# Patient Record
Sex: MALE | Race: BLACK OR AFRICAN AMERICAN | NOT HISPANIC OR LATINO | URBAN - METROPOLITAN AREA
[De-identification: names, ages, dates, MRNs, and addresses within clinical notes are randomized per-mention and may not be internally consistent; named-entity substitution may affect disease eponyms.]

---

## 2019-03-06 ENCOUNTER — OUTPATIENT (OUTPATIENT)
Dept: OUTPATIENT SERVICES | Facility: HOSPITAL | Age: 24
LOS: 1 days | End: 2019-03-06
Payer: COMMERCIAL

## 2019-03-06 VITALS
RESPIRATION RATE: 16 BRPM | SYSTOLIC BLOOD PRESSURE: 121 MMHG | TEMPERATURE: 98 F | WEIGHT: 212.75 LBS | HEART RATE: 74 BPM | DIASTOLIC BLOOD PRESSURE: 82 MMHG

## 2019-03-06 DIAGNOSIS — Z98.890 OTHER SPECIFIED POSTPROCEDURAL STATES: Chronic | ICD-10-CM

## 2019-03-06 DIAGNOSIS — E04.1 NONTOXIC SINGLE THYROID NODULE: ICD-10-CM

## 2019-03-06 DIAGNOSIS — D34 BENIGN NEOPLASM OF THYROID GLAND: ICD-10-CM

## 2019-03-06 DIAGNOSIS — Z01.818 ENCOUNTER FOR OTHER PREPROCEDURAL EXAMINATION: ICD-10-CM

## 2019-03-06 DIAGNOSIS — Z29.9 ENCOUNTER FOR PROPHYLACTIC MEASURES, UNSPECIFIED: ICD-10-CM

## 2019-03-06 LAB
ANION GAP SERPL CALC-SCNC: 10 MMOL/L — SIGNIFICANT CHANGE UP (ref 5–17)
APTT BLD: 32.8 SEC — SIGNIFICANT CHANGE UP (ref 27.5–36.3)
BASOPHILS # BLD AUTO: 0 K/UL — SIGNIFICANT CHANGE UP (ref 0–0.2)
BASOPHILS NFR BLD AUTO: 0.9 % — SIGNIFICANT CHANGE UP (ref 0–2)
BLD GP AB SCN SERPL QL: SIGNIFICANT CHANGE UP
BUN SERPL-MCNC: 10 MG/DL — SIGNIFICANT CHANGE UP (ref 8–20)
CALCIUM SERPL-MCNC: 9.4 MG/DL — SIGNIFICANT CHANGE UP (ref 8.6–10.2)
CHLORIDE SERPL-SCNC: 106 MMOL/L — SIGNIFICANT CHANGE UP (ref 98–107)
CO2 SERPL-SCNC: 27 MMOL/L — SIGNIFICANT CHANGE UP (ref 22–29)
CREAT SERPL-MCNC: 0.88 MG/DL — SIGNIFICANT CHANGE UP (ref 0.5–1.3)
EOSINOPHIL # BLD AUTO: 0.1 K/UL — SIGNIFICANT CHANGE UP (ref 0–0.5)
EOSINOPHIL NFR BLD AUTO: 1.6 % — SIGNIFICANT CHANGE UP (ref 0–5)
GLUCOSE SERPL-MCNC: 111 MG/DL — SIGNIFICANT CHANGE UP (ref 70–115)
HCT VFR BLD CALC: 44.2 % — SIGNIFICANT CHANGE UP (ref 42–52)
HGB BLD-MCNC: 15.6 G/DL — SIGNIFICANT CHANGE UP (ref 14–18)
INR BLD: 1 RATIO — SIGNIFICANT CHANGE UP (ref 0.88–1.16)
LYMPHOCYTES # BLD AUTO: 1.5 K/UL — SIGNIFICANT CHANGE UP (ref 1–4.8)
LYMPHOCYTES # BLD AUTO: 33.6 % — SIGNIFICANT CHANGE UP (ref 20–55)
MCHC RBC-ENTMCNC: 31.6 PG — HIGH (ref 27–31)
MCHC RBC-ENTMCNC: 35.3 G/DL — SIGNIFICANT CHANGE UP (ref 32–36)
MCV RBC AUTO: 89.7 FL — SIGNIFICANT CHANGE UP (ref 80–94)
MONOCYTES # BLD AUTO: 0.4 K/UL — SIGNIFICANT CHANGE UP (ref 0–0.8)
MONOCYTES NFR BLD AUTO: 8.5 % — SIGNIFICANT CHANGE UP (ref 3–10)
NEUTROPHILS # BLD AUTO: 2.5 K/UL — SIGNIFICANT CHANGE UP (ref 1.8–8)
NEUTROPHILS NFR BLD AUTO: 55.2 % — SIGNIFICANT CHANGE UP (ref 37–73)
PLATELET # BLD AUTO: 218 K/UL — SIGNIFICANT CHANGE UP (ref 150–400)
POTASSIUM SERPL-MCNC: 4.4 MMOL/L — SIGNIFICANT CHANGE UP (ref 3.5–5.3)
POTASSIUM SERPL-SCNC: 4.4 MMOL/L — SIGNIFICANT CHANGE UP (ref 3.5–5.3)
PROTHROM AB SERPL-ACNC: 11.5 SEC — SIGNIFICANT CHANGE UP (ref 10–12.9)
RBC # BLD: 4.93 M/UL — SIGNIFICANT CHANGE UP (ref 4.6–6.2)
RBC # FLD: 12.9 % — SIGNIFICANT CHANGE UP (ref 11–15.6)
SODIUM SERPL-SCNC: 143 MMOL/L — SIGNIFICANT CHANGE UP (ref 135–145)
T3 SERPL-MCNC: 116 NG/DL — SIGNIFICANT CHANGE UP (ref 80–200)
T4 AB SER-ACNC: 5.1 UG/DL — SIGNIFICANT CHANGE UP (ref 4.5–12)
TSH SERPL-MCNC: 0.7 UIU/ML — SIGNIFICANT CHANGE UP (ref 0.27–4.2)
TYPE + AB SCN PNL BLD: SIGNIFICANT CHANGE UP
WBC # BLD: 4.5 K/UL — LOW (ref 4.8–10.8)
WBC # FLD AUTO: 4.5 K/UL — LOW (ref 4.8–10.8)

## 2019-03-06 PROCEDURE — 36415 COLL VENOUS BLD VENIPUNCTURE: CPT

## 2019-03-06 PROCEDURE — 86901 BLOOD TYPING SEROLOGIC RH(D): CPT

## 2019-03-06 PROCEDURE — 85610 PROTHROMBIN TIME: CPT

## 2019-03-06 PROCEDURE — 86850 RBC ANTIBODY SCREEN: CPT

## 2019-03-06 PROCEDURE — 84443 ASSAY THYROID STIM HORMONE: CPT

## 2019-03-06 PROCEDURE — 86900 BLOOD TYPING SEROLOGIC ABO: CPT

## 2019-03-06 PROCEDURE — 84436 ASSAY OF TOTAL THYROXINE: CPT

## 2019-03-06 PROCEDURE — 80048 BASIC METABOLIC PNL TOTAL CA: CPT

## 2019-03-06 PROCEDURE — 84480 ASSAY TRIIODOTHYRONINE (T3): CPT

## 2019-03-06 PROCEDURE — 85027 COMPLETE CBC AUTOMATED: CPT

## 2019-03-06 PROCEDURE — 85730 THROMBOPLASTIN TIME PARTIAL: CPT

## 2019-03-06 NOTE — H&P PST ADULT - FAMILY HISTORY
Father  Still living? Yes, Estimated age: 51-60  Family history of hypertension, Age at diagnosis: Age Unknown

## 2019-03-06 NOTE — H&P PST ADULT - NSANTHOSAYNRD_GEN_A_CORE
No. KAMINI screening performed.  STOP BANG Legend: 0-2 = LOW Risk; 3-4 = INTERMEDIATE Risk; 5-8 = HIGH Risk

## 2019-03-06 NOTE — ASU PATIENT PROFILE, ADULT - LEARNING ASSESSMENT (PATIENT) ADDITIONAL COMMENTS
presurgical, surgical scrun instructrions, pain management education given - verbalized understanding

## 2019-03-06 NOTE — H&P PST ADULT - HISTORY OF PRESENT ILLNESS
23 year old male went to ENT with c/o lump in mouth in NOV 2018, upon exam DR noticed right thyroid enlargement . Pt had sono and needle biopsy and results showed 60 % possibility of cancer as per pt.  Pt denied any difficulty swallowing and never noticed enlargement . Pt is scheduled for right hemithyroidectomy possible total thyroidectomy depending on findings.

## 2019-03-06 NOTE — H&P PST ADULT - ASSESSMENT
Pleasant 23 year old male in NAD with right thyroid nodule presents for right thyroidectomy .  CAPRINI VTE 2.0 SCORE [CLOT updated 2019]    AGE RELATED RISK FACTORS                                                       MOBILITY RELATED FACTORS  [ ] Age 41-60 years                                            (1 Point)                    [ ] Bed rest                                                        (1 Point)  [ ] Age: 61-74 years                                           (2 Points)                  [ ] Plaster cast                                                   (2 Points)  [ ] Age= 75 years                                              (3 Points)                    [ ] Bed bound for more than 72 hours                 (2 Points)    DISEASE RELATED RISK FACTORS                                               GENDER SPECIFIC FACTORS  [ ] Edema in the lower extremities                       (1 Point)              [ ] Pregnancy                                                     (1 Point)  [ ] Varicose veins                                               (1 Point)                     [ ] Post-partum < 6 weeks                                   (1 Point)             [ ] BMI > 25 Kg/m2                                            (1 Point)                     [ ] Hormonal therapy  or oral contraception          (1 Point)                 [ ] Sepsis (in the previous month)                        (1 Point)               [ ] History of pregnancy complications                 (1 point)  [ ] Pneumonia or serious lung disease                                               [ ] Unexplained or recurrent                     (1 Point)           (in the previous month)                               (1 Point)  [ ] Abnormal pulmonary function test                     (1 Point)                 SURGERY RELATED RISK FACTORS  [ ] Acute myocardial infarction                              (1 Point)               [ ]  Section                                             (1 Point)  [ ] Congestive heart failure (in the previous month)  (1 Point)      [ ] Minor surgery                                                  (1 Point)   [ ] Inflammatory bowel disease                             (1 Point)               [ ] Arthroscopic surgery                                        (2 Points)  [ ] Central venous access                                      (2 Points)                [X ] General surgery lasting more than 45 minutes (2 points)  [ X] Malignancy- Present or previous                   (2 Points)                [ ] Elective arthroplasty                                         (5 points)    [ ] Stroke (in the previous month)                          (5 Points)                                                                                                                                                           HEMATOLOGY RELATED FACTORS                                                 TRAUMA RELATED RISK FACTORS  [ ] Prior episodes of VTE                                     (3 Points)                [ ] Fracture of the hip, pelvis, or leg                       (5 Points)  [ ] Positive family history for VTE                         (3 Points)             [ ] Acute spinal cord injury (in the previous month)  (5 Points)  [ ] Prothrombin 35826 A                                     (3 Points)               [ ] Paralysis  (less than 1 month)                             (5 Points)  [ ] Factor V Leiden                                             (3 Points)                  [ ] Multiple Trauma within 1 month                        (5 Points)  [ ] Lupus anticoagulants                                     (3 Points)                                                           [ ] Anticardiolipin antibodies                               (3 Points)                                                       [ ] High homocysteine in the blood                      (3 Points)                                             [ ] Other congenital or acquired thrombophilia      (3 Points)                                                [ ] Heparin induced thrombocytopenia                  (3 Points)                                     Total Score [    4     ]  OPIOID RISK TOOL    CHE EACH BOX THAT APPLIES AND ADD TOTALS AT THE END    FAMILY HISTORY OF SUBSTANCE ABUSE                 FEMALE         MALE                                                Alcohol                             [  ]1 pt          [  ]3pts                                               Illegal Durgs                     [  ]2 pts        [  ]3pts                                               Rx Drugs                           [  ]4 pts        [  ]4 pts    PERSONAL HISTORY OF SUBSTANCE ABUSE                                                                                          Alcohol                             [  ]3 pts       [  ]3 pts                                               Illegal Durgs                     [  ]4 pts        [  ]4 pts                                               Rx Drugs                           [  ]5 pts        [  ]5 pts    AGE BETWEEN 16-45 YEARS                                      [  ]1 pt         [x  ]1 pt    HISTORY OF PREADOLESCENT   SEXUAL ABUSE                                                             [  ]3 pts        [  ]0pts    PSYCHOLOGICAL DISEASE                     ADD, OCD, Bipolar, Schizophrenia        [  ]2 pts         [  ]2 pts                      Depression                                               [  ]1 pt           [  ]1 pt           SCORING TOTAL   (add numbers and type here)              (*1**)                                     A score of 3 or lower indicated LOW risk for future opiod abuse  A score of 4 to 7 indicated moderate risk for future opiod abuse  A score of 8 or higher indicates a high risk for opiod abuse

## 2019-03-19 ENCOUNTER — TRANSCRIPTION ENCOUNTER (OUTPATIENT)
Age: 24
End: 2019-03-19

## 2019-03-20 ENCOUNTER — OUTPATIENT (OUTPATIENT)
Dept: INPATIENT UNIT | Facility: HOSPITAL | Age: 24
LOS: 1 days | End: 2019-03-20
Payer: COMMERCIAL

## 2019-03-20 ENCOUNTER — RESULT REVIEW (OUTPATIENT)
Age: 24
End: 2019-03-20

## 2019-03-20 VITALS
HEART RATE: 83 BPM | SYSTOLIC BLOOD PRESSURE: 117 MMHG | DIASTOLIC BLOOD PRESSURE: 72 MMHG | RESPIRATION RATE: 12 BRPM | OXYGEN SATURATION: 100 %

## 2019-03-20 VITALS
OXYGEN SATURATION: 100 % | WEIGHT: 212.75 LBS | TEMPERATURE: 98 F | SYSTOLIC BLOOD PRESSURE: 122 MMHG | HEART RATE: 98 BPM | DIASTOLIC BLOOD PRESSURE: 76 MMHG | HEIGHT: 68 IN | RESPIRATION RATE: 16 BRPM

## 2019-03-20 DIAGNOSIS — Z98.890 OTHER SPECIFIED POSTPROCEDURAL STATES: Chronic | ICD-10-CM

## 2019-03-20 DIAGNOSIS — D34 BENIGN NEOPLASM OF THYROID GLAND: ICD-10-CM

## 2019-03-20 DIAGNOSIS — Z98.890 OTHER SPECIFIED POSTPROCEDURAL STATES: ICD-10-CM

## 2019-03-20 PROCEDURE — 88307 TISSUE EXAM BY PATHOLOGIST: CPT

## 2019-03-20 PROCEDURE — 88331 PATH CONSLTJ SURG 1 BLK 1SPC: CPT

## 2019-03-20 PROCEDURE — 88307 TISSUE EXAM BY PATHOLOGIST: CPT | Mod: 26

## 2019-03-20 PROCEDURE — 88331 PATH CONSLTJ SURG 1 BLK 1SPC: CPT | Mod: 26

## 2019-03-20 PROCEDURE — 60210 PARTIAL THYROID EXCISION: CPT | Mod: RT

## 2019-03-20 RX ORDER — ONDANSETRON 8 MG/1
4 TABLET, FILM COATED ORAL ONCE
Qty: 0 | Refills: 0 | Status: DISCONTINUED | OUTPATIENT
Start: 2019-03-20 | End: 2019-03-20

## 2019-03-20 RX ORDER — SODIUM CHLORIDE 9 MG/ML
1000 INJECTION, SOLUTION INTRAVENOUS
Qty: 0 | Refills: 0 | Status: DISCONTINUED | OUTPATIENT
Start: 2019-03-20 | End: 2019-03-20

## 2019-03-20 RX ORDER — HYDROMORPHONE HYDROCHLORIDE 2 MG/ML
0.5 INJECTION INTRAMUSCULAR; INTRAVENOUS; SUBCUTANEOUS
Qty: 0 | Refills: 0 | Status: DISCONTINUED | OUTPATIENT
Start: 2019-03-20 | End: 2019-03-20

## 2019-03-20 RX ORDER — FENTANYL CITRATE 50 UG/ML
50 INJECTION INTRAVENOUS
Qty: 0 | Refills: 0 | Status: DISCONTINUED | OUTPATIENT
Start: 2019-03-20 | End: 2019-03-20

## 2019-03-20 NOTE — BRIEF OPERATIVE NOTE - NSICDXBRIEFPREOP_GEN_ALL_CORE_FT
PRE-OP DIAGNOSIS:  Thyroid nodule 20-Mar-2019 11:50:33  Ric Edwards  Thyroid nodule 20-Mar-2019 11:50:15  Ric Edwards

## 2019-04-02 LAB — SURGICAL PATHOLOGY STUDY: SIGNIFICANT CHANGE UP

## 2019-04-15 PROBLEM — E04.1 NONTOXIC SINGLE THYROID NODULE: Chronic | Status: ACTIVE | Noted: 2019-03-06

## 2019-05-15 ENCOUNTER — OUTPATIENT (OUTPATIENT)
Dept: OUTPATIENT SERVICES | Facility: HOSPITAL | Age: 24
LOS: 1 days | End: 2019-05-15
Payer: COMMERCIAL

## 2019-05-15 VITALS
SYSTOLIC BLOOD PRESSURE: 104 MMHG | HEIGHT: 68 IN | RESPIRATION RATE: 16 BRPM | DIASTOLIC BLOOD PRESSURE: 68 MMHG | TEMPERATURE: 98 F | HEART RATE: 72 BPM | WEIGHT: 210.54 LBS

## 2019-05-15 DIAGNOSIS — Z98.890 OTHER SPECIFIED POSTPROCEDURAL STATES: Chronic | ICD-10-CM

## 2019-05-15 DIAGNOSIS — Z29.9 ENCOUNTER FOR PROPHYLACTIC MEASURES, UNSPECIFIED: ICD-10-CM

## 2019-05-15 DIAGNOSIS — C73 MALIGNANT NEOPLASM OF THYROID GLAND: ICD-10-CM

## 2019-05-15 DIAGNOSIS — Z01.818 ENCOUNTER FOR OTHER PREPROCEDURAL EXAMINATION: ICD-10-CM

## 2019-05-15 LAB
ANION GAP SERPL CALC-SCNC: 10 MMOL/L — SIGNIFICANT CHANGE UP (ref 5–17)
BASOPHILS # BLD AUTO: 0 K/UL — SIGNIFICANT CHANGE UP (ref 0–0.2)
BASOPHILS NFR BLD AUTO: 1 % — SIGNIFICANT CHANGE UP (ref 0–2)
BUN SERPL-MCNC: 14 MG/DL — SIGNIFICANT CHANGE UP (ref 8–20)
CALCIUM SERPL-MCNC: 9.7 MG/DL — SIGNIFICANT CHANGE UP (ref 8.6–10.2)
CHLORIDE SERPL-SCNC: 102 MMOL/L — SIGNIFICANT CHANGE UP (ref 98–107)
CO2 SERPL-SCNC: 28 MMOL/L — SIGNIFICANT CHANGE UP (ref 22–29)
CREAT SERPL-MCNC: 0.98 MG/DL — SIGNIFICANT CHANGE UP (ref 0.5–1.3)
EOSINOPHIL # BLD AUTO: 0.1 K/UL — SIGNIFICANT CHANGE UP (ref 0–0.5)
EOSINOPHIL NFR BLD AUTO: 1.5 % — SIGNIFICANT CHANGE UP (ref 0–5)
GLUCOSE SERPL-MCNC: 103 MG/DL — SIGNIFICANT CHANGE UP (ref 70–115)
HCT VFR BLD CALC: 46.9 % — SIGNIFICANT CHANGE UP (ref 42–52)
HGB BLD-MCNC: 16.2 G/DL — SIGNIFICANT CHANGE UP (ref 14–18)
LYMPHOCYTES # BLD AUTO: 1.7 K/UL — SIGNIFICANT CHANGE UP (ref 1–4.8)
LYMPHOCYTES # BLD AUTO: 32 % — SIGNIFICANT CHANGE UP (ref 20–55)
MCHC RBC-ENTMCNC: 31.1 PG — HIGH (ref 27–31)
MCHC RBC-ENTMCNC: 34.5 G/DL — SIGNIFICANT CHANGE UP (ref 32–36)
MCV RBC AUTO: 90 FL — SIGNIFICANT CHANGE UP (ref 80–94)
MONOCYTES # BLD AUTO: 0.3 K/UL — SIGNIFICANT CHANGE UP (ref 0–0.8)
MONOCYTES NFR BLD AUTO: 5.8 % — SIGNIFICANT CHANGE UP (ref 3–10)
NEUTROPHILS # BLD AUTO: 3.1 K/UL — SIGNIFICANT CHANGE UP (ref 1.8–8)
NEUTROPHILS NFR BLD AUTO: 59.7 % — SIGNIFICANT CHANGE UP (ref 37–73)
PLATELET # BLD AUTO: 242 K/UL — SIGNIFICANT CHANGE UP (ref 150–400)
POTASSIUM SERPL-MCNC: 4.6 MMOL/L — SIGNIFICANT CHANGE UP (ref 3.5–5.3)
POTASSIUM SERPL-SCNC: 4.6 MMOL/L — SIGNIFICANT CHANGE UP (ref 3.5–5.3)
RBC # BLD: 5.21 M/UL — SIGNIFICANT CHANGE UP (ref 4.6–6.2)
RBC # FLD: 12.9 % — SIGNIFICANT CHANGE UP (ref 11–15.6)
SODIUM SERPL-SCNC: 140 MMOL/L — SIGNIFICANT CHANGE UP (ref 135–145)
WBC # BLD: 5.2 K/UL — SIGNIFICANT CHANGE UP (ref 4.8–10.8)
WBC # FLD AUTO: 5.2 K/UL — SIGNIFICANT CHANGE UP (ref 4.8–10.8)

## 2019-05-15 PROCEDURE — 36415 COLL VENOUS BLD VENIPUNCTURE: CPT

## 2019-05-15 PROCEDURE — 80048 BASIC METABOLIC PNL TOTAL CA: CPT

## 2019-05-15 PROCEDURE — 85027 COMPLETE CBC AUTOMATED: CPT

## 2019-05-15 PROCEDURE — G0463: CPT

## 2019-05-15 NOTE — H&P PST ADULT - NSICDXPROBLEM_GEN_ALL_CORE_FT
PROBLEM DIAGNOSES  Problem: Prophylactic measure  Assessment and Plan: caprini score 4 , scds ordered, surgical personnell to assess for pharm proph need    Problem: Thyroid ca  Assessment and Plan: Completion thyroidectomy

## 2019-05-15 NOTE — H&P PST ADULT - ASSESSMENT
Pt is a 22 y/o male who presents for evaluation and testing for completion thyroidectomy. Pt is in good health as per physical examination.  OPIOID RISK TOOL    CHE EACH BOX THAT APPLIES AND ADD TOTALS AT THE END    FAMILY HISTORY OF SUBSTANCE ABUSE                 FEMALE         MALE                                                Alcohol                             [  ]1 pt          [  ]3pts                                               Illegal Durgs                     [  ]2 pts        [  ]3pts                                               Rx Drugs                           [  ]4 pts        [  ]4 pts    PERSONAL HISTORY OF SUBSTANCE ABUSE                                                                                          Alcohol                             [  ]3 pts       [  ]3 pts                                               Illegal Drugs                     [  ]4 pts        [  ]4 pts                                               Rx Drugs                           [  ]5 pts        [  ]5 pts    AGE BETWEEN 16-45 YEARS                                      [  ]1 pt         [ x ]1 pt    HISTORY OF PREADOLESCENT   SEXUAL ABUSE                                                             [  ]3 pts        [  ]0pts    PSYCHOLOGICAL DISEASE                     ADD, OCD, Bipolar, Schizophrenia        [  ]2 pts         [  ]2 pts                      Depression                                               [  ]1 pt           [  ]1 pt           SCORING TOTAL   (add numbers and type here)              (1)                CAPRINI VTE 2.0 SCORE [CLOT updated 2019]    AGE RELATED RISK FACTORS                                                       MOBILITY RELATED FACTORS  [ ] Age 41-60 years                                            (1 Point)                    [ ] Bed rest                                                        (1 Point)  [ ] Age: 61-74 years                                           (2 Points)                  [ ] Plaster cast                                                   (2 Points)  [ ] Age= 75 years                                              (3 Points)                    [ ] Bed bound for more than 72 hours                 (2 Points)    DISEASE RELATED RISK FACTORS                                               GENDER SPECIFIC FACTORS  [ ] Edema in the lower extremities                       (1 Point)              [ ] Pregnancy                                                     (1 Point)  [ ] Varicose veins                                               (1 Point)                     [ ] Post-partum < 6 weeks                                   (1 Point)             [x ] BMI > 25 Kg/m2                                            (1 Point)                     [ ] Hormonal therapy  or oral contraception          (1 Point)                 [ ] Sepsis (in the previous month)                        (1 Point)               [ ] History of pregnancy complications                 (1 point)  [ ] Pneumonia or serious lung disease                                               [ ] Unexplained or recurrent                     (1 Point)           (in the previous month)                               (1 Point)  [ ] Abnormal pulmonary function test                     (1 Point)                 SURGERY RELATED RISK FACTORS  [ ] Acute myocardial infarction                              (1 Point)               [ ]  Section                                             (1 Point)  [ ] Congestive heart failure (in the previous month)  (1 Point)      [x ] Minor surgery                                                  (1 Point)   [ ] Inflammatory bowel disease                             (1 Point)               [ ] Arthroscopic surgery                                        (2 Points)  [ ] Central venous access                                      (2 Points)                [ ] General surgery lasting more than 45 minutes (2 points)  [x ] Malignancy- Present or previous                   (2 Points)                [ ] Elective arthroplasty                                         (5 points)    [ ] Stroke (in the previous month)                          (5 Points)                                                                                                                                                           HEMATOLOGY RELATED FACTORS                                                 TRAUMA RELATED RISK FACTORS  [ ] Prior episodes of VTE                                     (3 Points)                [ ] Fracture of the hip, pelvis, or leg                       (5 Points)  [ ] Positive family history for VTE                         (3 Points)             [ ] Acute spinal cord injury (in the previous month)  (5 Points)  [ ] Prothrombin 34639 A                                     (3 Points)               [ ] Paralysis  (less than 1 month)                             (5 Points)  [ ] Factor V Leiden                                             (3 Points)                  [ ] Multiple Trauma within 1 month                        (5 Points)  [ ] Lupus anticoagulants                                     (3 Points)                                                           [ ] Anticardiolipin antibodies                               (3 Points)                                                       [ ] High homocysteine in the blood                      (3 Points)                                             [ ] Other congenital or acquired thrombophilia      (3 Points)                                                [ ] Heparin induced thrombocytopenia                  (3 Points)                                     Total Score [  4       ]                         A score of 3 or lower indicated LOW risk for future opioid abuse  A score of 4 to 7 indicated moderate risk for future opioid abuse  A score of 8 or higher indicates a high risk for opioid abuse

## 2019-05-15 NOTE — H&P PST ADULT - HISTORY OF PRESENT ILLNESS
23 year old male with PMH thyroid nodule presents for evaluation and testing for completion thyroidectomy. Pt states he went to ENT with c/o lump in mouth in NOV 2018, upon exam DR noticed right thyroid nodule. Pt had sono and needle biopsy and results showed 60 % possibility of cancer. Pt had right hemithyroidectomy which resulted in the finding of cancerous cells, so completion thyroidectomy was scheduled  . Pt denied any difficulty swallowing and never noticed enlargement. Pt s/s of hypo/hyperthyroidism.

## 2019-05-15 NOTE — H&P PST ADULT - NSICDXFAMILYHX_GEN_ALL_CORE_FT
FAMILY HISTORY:  FH: diabetes mellitus, maternal and paternal grandparents  FH: prostate cancer, gradfather    Father  Still living? Yes, Estimated age: 51-60  Family history of hypertension, Age at diagnosis: Age Unknown

## 2019-05-29 ENCOUNTER — INPATIENT (INPATIENT)
Facility: HOSPITAL | Age: 24
LOS: 0 days | Discharge: HOME | DRG: 983 | End: 2019-05-30
Attending: OTOLARYNGOLOGY | Admitting: OTOLARYNGOLOGY
Payer: COMMERCIAL

## 2019-05-29 VITALS
HEART RATE: 86 BPM | DIASTOLIC BLOOD PRESSURE: 85 MMHG | OXYGEN SATURATION: 100 % | WEIGHT: 210.54 LBS | RESPIRATION RATE: 16 BRPM | HEIGHT: 68 IN | SYSTOLIC BLOOD PRESSURE: 126 MMHG | TEMPERATURE: 98 F

## 2019-05-29 DIAGNOSIS — C73 MALIGNANT NEOPLASM OF THYROID GLAND: ICD-10-CM

## 2019-05-29 DIAGNOSIS — Z98.890 OTHER SPECIFIED POSTPROCEDURAL STATES: Chronic | ICD-10-CM

## 2019-05-29 LAB — PTH-INTACT IO % DIF SERPL: 38 PG/ML — SIGNIFICANT CHANGE UP (ref 12–88)

## 2019-05-29 PROCEDURE — 88307 TISSUE EXAM BY PATHOLOGIST: CPT | Mod: 26

## 2019-05-29 PROCEDURE — 99222 1ST HOSP IP/OBS MODERATE 55: CPT

## 2019-05-29 PROCEDURE — 71045 X-RAY EXAM CHEST 1 VIEW: CPT | Mod: 26

## 2019-05-29 RX ORDER — ENOXAPARIN SODIUM 100 MG/ML
40 INJECTION SUBCUTANEOUS EVERY 24 HOURS
Refills: 0 | Status: DISCONTINUED | OUTPATIENT
Start: 2019-05-29 | End: 2019-05-30

## 2019-05-29 RX ORDER — FENTANYL CITRATE 50 UG/ML
50 INJECTION INTRAVENOUS
Refills: 0 | Status: DISCONTINUED | OUTPATIENT
Start: 2019-05-29 | End: 2019-05-29

## 2019-05-29 RX ORDER — SODIUM CHLORIDE 9 MG/ML
1000 INJECTION, SOLUTION INTRAVENOUS
Refills: 0 | Status: DISCONTINUED | OUTPATIENT
Start: 2019-05-29 | End: 2019-05-29

## 2019-05-29 RX ORDER — SENNA PLUS 8.6 MG/1
2 TABLET ORAL AT BEDTIME
Refills: 0 | Status: DISCONTINUED | OUTPATIENT
Start: 2019-05-29 | End: 2019-05-30

## 2019-05-29 RX ORDER — SODIUM CHLORIDE 9 MG/ML
3 INJECTION INTRAMUSCULAR; INTRAVENOUS; SUBCUTANEOUS ONCE
Refills: 0 | Status: DISCONTINUED | OUTPATIENT
Start: 2019-05-29 | End: 2019-05-29

## 2019-05-29 RX ORDER — ONDANSETRON 8 MG/1
4 TABLET, FILM COATED ORAL ONCE
Refills: 0 | Status: DISCONTINUED | OUTPATIENT
Start: 2019-05-29 | End: 2019-05-29

## 2019-05-29 RX ORDER — OXYCODONE AND ACETAMINOPHEN 5; 325 MG/1; MG/1
1 TABLET ORAL EVERY 6 HOURS
Refills: 0 | Status: DISCONTINUED | OUTPATIENT
Start: 2019-05-29 | End: 2019-05-30

## 2019-05-29 RX ORDER — AZITHROMYCIN 500 MG/1
600 TABLET, FILM COATED ORAL DAILY
Refills: 0 | Status: DISCONTINUED | OUTPATIENT
Start: 2019-05-29 | End: 2019-05-29

## 2019-05-29 RX ORDER — ONDANSETRON 8 MG/1
4 TABLET, FILM COATED ORAL EVERY 6 HOURS
Refills: 0 | Status: DISCONTINUED | OUTPATIENT
Start: 2019-05-29 | End: 2019-05-30

## 2019-05-29 RX ORDER — OXYCODONE AND ACETAMINOPHEN 5; 325 MG/1; MG/1
1 TABLET ORAL
Qty: 16 | Refills: 0
Start: 2019-05-29

## 2019-05-29 RX ORDER — GENTAMICIN SULFATE 40 MG/ML
210 VIAL (ML) INJECTION ONCE
Refills: 0 | Status: DISCONTINUED | OUTPATIENT
Start: 2019-05-29 | End: 2019-05-29

## 2019-05-29 RX ORDER — AZITHROMYCIN 500 MG/1
500 TABLET, FILM COATED ORAL DAILY
Refills: 0 | Status: DISCONTINUED | OUTPATIENT
Start: 2019-05-29 | End: 2019-05-30

## 2019-05-29 RX ORDER — DOCUSATE SODIUM 100 MG
100 CAPSULE ORAL EVERY 8 HOURS
Refills: 0 | Status: DISCONTINUED | OUTPATIENT
Start: 2019-05-29 | End: 2019-05-30

## 2019-05-29 RX ORDER — OXYCODONE AND ACETAMINOPHEN 5; 325 MG/1; MG/1
2 TABLET ORAL EVERY 6 HOURS
Refills: 0 | Status: DISCONTINUED | OUTPATIENT
Start: 2019-05-29 | End: 2019-05-30

## 2019-05-29 RX ADMIN — SENNA PLUS 2 TABLET(S): 8.6 TABLET ORAL at 22:59

## 2019-05-29 RX ADMIN — AZITHROMYCIN 500 MILLIGRAM(S): 500 TABLET, FILM COATED ORAL at 19:02

## 2019-05-29 RX ADMIN — FENTANYL CITRATE 50 MICROGRAM(S): 50 INJECTION INTRAVENOUS at 10:52

## 2019-05-29 RX ADMIN — FENTANYL CITRATE 50 MICROGRAM(S): 50 INJECTION INTRAVENOUS at 11:28

## 2019-05-29 RX ADMIN — ENOXAPARIN SODIUM 40 MILLIGRAM(S): 100 INJECTION SUBCUTANEOUS at 22:59

## 2019-05-29 RX ADMIN — OXYCODONE AND ACETAMINOPHEN 1 TABLET(S): 5; 325 TABLET ORAL at 23:30

## 2019-05-29 RX ADMIN — OXYCODONE AND ACETAMINOPHEN 1 TABLET(S): 5; 325 TABLET ORAL at 23:00

## 2019-05-29 RX ADMIN — Medication 100 MILLIGRAM(S): at 22:59

## 2019-05-29 NOTE — BRIEF OPERATIVE NOTE - NSICDXBRIEFPROCEDURE_GEN_ALL_CORE_FT
PROCEDURES:  Completion thyroidectomy 29-May-2019 10:37:43  Darrell Varghese
PROCEDURES:  Biopsy, thyroid 29-May-2019 11:03:23  Ric Edwards  Completion thyroidectomy 29-May-2019 10:37:43  Darrell Varghese

## 2019-05-29 NOTE — ASU DISCHARGE PLAN (ADULT/PEDIATRIC) - CALL YOUR DOCTOR IF YOU HAVE ANY OF THE FOLLOWING:
Fever greater than (need to indicate Fahrenheit or Celsius)/Nausea and vomiting that does not stop/Inability to tolerate liquids or foods/Bleeding that does not stop/Pain not relieved by Medications/Increased irritability or sluggishness

## 2019-05-29 NOTE — CONSULT NOTE ADULT - ASSESSMENT
23yr old male with no significant medical history, was scheduled for an elective thyroidectomy. He is s/p completion thyroidectomy under GA for thyroid cancer. Per RN and surgeon, pt has episodes of Oxygen desaturations to 88 and 90s on RA requiring nasal Oxygen, with difficulty weaning off O2. Which lead to work up including CXR - showed B/l infiltrate Rt >Lt.  most likely 2/2 an aspiration event.     # Post operative hypoxia - most likely 2/2 Aspiration pneumonitis   Would monitor on   Nebs - Albuterol q6 PRN for wheezing  Consider Augmentin if secretions persists and unable to clear   Would observe overnight to ensure no further episodes of hypoxia  O2 nasal PRN to maintain sats >95    # s/p thyroidectomy   pain control and rest management per ENT

## 2019-05-29 NOTE — ASU DISCHARGE PLAN (ADULT/PEDIATRIC) - CARE PROVIDER_API CALL
Ric Edwards)  Otolaryngology  [address] [place] [address]  Phone: [phone]  Fax: [phone]  Follow Up Time: 1 week Ric Edwards)  Otolaryngology  [address] [place] [address]  Phone: [phone]  Fax: [phone]  Follow Up Time: 1 week

## 2019-05-29 NOTE — BRIEF OPERATIVE NOTE - NSICDXBRIEFPREOP_GEN_ALL_CORE_FT
PRE-OP DIAGNOSIS:  Thyroid cancer 29-May-2019 10:38:04  Darrell Varghese
PRE-OP DIAGNOSIS:  Thyroid cancer 29-May-2019 11:03:49  Ric Edwards

## 2019-05-29 NOTE — CONSULT NOTE ADULT - SUBJECTIVE AND OBJECTIVE BOX
PMD : Ata       chief complaint : post operative cough      HPI:  23yr old male with no significant medical history, was scheduled for an elective thyroidectomy. He is s/p completion thyroidectomy under General anesthesia  for thyroid cancer. Per RN and surgeon, pt has episodes of Oxygen desaturations to 88 and 90s on RA requiring nasal Oxygen, with difficulty weaning off O2. Which lead to work up including CXR - showed B/l infiltrate Rt >Lt. Pt c/o Cough with mucoid expectoration mixed with ernesto blood. Denies any pain or difficulty breathing, He is currently c/o pain at the operative site and not coughing much.   he is Currently on RA saturating 94-95%       PAST MEDICAL & SURGICAL HISTORY:  Thyroid nodule: right  S/P thyroid biopsy: needle biopsy  H/O umbilical hernia repair      Social History:  Tabacco - denies  ETOH - denies  Illicit drug abuse - denies    FAMILY HISTORY:  FH: prostate cancer: gradfather  FH: diabetes mellitus: maternal and paternal grandparents  Family history of hypertension (Father)      Allergies    penicillins (Rash)    Intolerances        HOME MEDICATIONS : none    REVIEW OF SYSTEMS:    CONSTITUTIONAL: No fever,  EYES: No eye pain, visual disturbances, or discharge  NECK: No pain or stiffness  RESPIRATORY:+ cough, no wheezing,no shortness of breath  CARDIOVASCULAR: No chest pain, palpitations, dizziness, or leg swelling  GASTROINTESTINAL: No abdominal or epigastric pain. No nausea, vomiting, or hematemesis  GENITOURINARY: No dysuria, frequency, hematuria, or incontinence  NEUROLOGICAL: No headaches, memory loss, loss of strength, numbness, or tremors  SKIN: No itching, burning, rashes, or lesions   ENDOCRINE: No heat or cold intolerance      MEDICATIONS  (STANDING):  lactated ringers. 1000 milliLiter(s) (125 mL/Hr) IV Continuous <Continuous>    MEDICATIONS  (PRN):  fentaNYL    Injectable 50 MICROGram(s) IV Push every 15 minutes PRN Severe Pain (7 - 10)  ondansetron Injectable 4 milliGRAM(s) IV Push once PRN Nausea and/or Vomiting      Vital Signs Last 24 Hrs  T(C): 37 (29 May 2019 16:15), Max: 37 (29 May 2019 16:15)  T(F): 98.6 (29 May 2019 16:15), Max: 98.6 (29 May 2019 16:15)  HR: 103 (29 May 2019 16:15) (86 - 106)  BP: 122/73 (29 May 2019 16:15) (113/69 - 130/74)  BP(mean): --  RR: 26 (29 May 2019 16:15) (14 - 31)  SpO2: 94% (29 May 2019 16:15) (92% - 100%)    PHYSICAL EXAM:    GENERAL: NAD, well-groomed, well-developed  HEAD:  Atraumatic, Normocephalic  EYES: EOMI, PERRLA, conjunctiva and sclera clear  NECK: Supple, No JVD . neck dressing +dry  NERVOUS SYSTEM:  Alert & Oriented X3, Good concentration  CHEST/LUNG: CTA  b/l,  rt sided mid lobe rales+  HEART: Regular rate and rhythm; No murmurs  ABDOMEN: Soft, Nontender, Nondistended; Bowel sounds present  EXTREMITIES:   No clubbing, cyanosis, or edema ,   SKIN: No rashes or lesions    LABS:  reviewed from 5/15            RADIOLOGY & ADDITIONAL STUDIES:    CXR -0 film reviewed - Infiltrates on Rt >Lt

## 2019-05-29 NOTE — BRIEF OPERATIVE NOTE - NSICDXBRIEFPOSTOP_GEN_ALL_CORE_FT
POST-OP DIAGNOSIS:  Thyroid cancer 29-May-2019 10:38:16  Darrell Varghese
POST-OP DIAGNOSIS:  Thyroid cancer 29-May-2019 11:04:03  Ric Edwards

## 2019-05-30 ENCOUNTER — TRANSCRIPTION ENCOUNTER (OUTPATIENT)
Age: 24
End: 2019-05-30

## 2019-05-30 VITALS
HEART RATE: 96 BPM | OXYGEN SATURATION: 98 % | RESPIRATION RATE: 18 BRPM | SYSTOLIC BLOOD PRESSURE: 132 MMHG | TEMPERATURE: 100 F | DIASTOLIC BLOOD PRESSURE: 83 MMHG

## 2019-05-30 LAB
ANION GAP SERPL CALC-SCNC: 11 MMOL/L — SIGNIFICANT CHANGE UP (ref 5–17)
BUN SERPL-MCNC: 12 MG/DL — SIGNIFICANT CHANGE UP (ref 8–20)
CALCIUM SERPL-MCNC: 8.9 MG/DL — SIGNIFICANT CHANGE UP (ref 8.6–10.2)
CHLORIDE SERPL-SCNC: 103 MMOL/L — SIGNIFICANT CHANGE UP (ref 98–107)
CO2 SERPL-SCNC: 25 MMOL/L — SIGNIFICANT CHANGE UP (ref 22–29)
CREAT SERPL-MCNC: 0.74 MG/DL — SIGNIFICANT CHANGE UP (ref 0.5–1.3)
EOSINOPHIL # BLD AUTO: 0 K/UL — SIGNIFICANT CHANGE UP (ref 0–0.5)
EOSINOPHIL NFR BLD AUTO: 0 % — SIGNIFICANT CHANGE UP (ref 0–5)
GLUCOSE SERPL-MCNC: 119 MG/DL — HIGH (ref 70–115)
HCT VFR BLD CALC: 42.1 % — SIGNIFICANT CHANGE UP (ref 42–52)
HGB BLD-MCNC: 14.8 G/DL — SIGNIFICANT CHANGE UP (ref 14–18)
LYMPHOCYTES # BLD AUTO: 1.2 K/UL — SIGNIFICANT CHANGE UP (ref 1–4.8)
LYMPHOCYTES # BLD AUTO: 11.5 % — LOW (ref 20–55)
MAGNESIUM SERPL-MCNC: 1.8 MG/DL — SIGNIFICANT CHANGE UP (ref 1.6–2.6)
MCHC RBC-ENTMCNC: 31.3 PG — HIGH (ref 27–31)
MCHC RBC-ENTMCNC: 35.2 G/DL — SIGNIFICANT CHANGE UP (ref 32–36)
MCV RBC AUTO: 89 FL — SIGNIFICANT CHANGE UP (ref 80–94)
MONOCYTES # BLD AUTO: 1.1 K/UL — HIGH (ref 0–0.8)
MONOCYTES NFR BLD AUTO: 10.1 % — HIGH (ref 3–10)
NEUTROPHILS # BLD AUTO: 8.3 K/UL — HIGH (ref 1.8–8)
NEUTROPHILS NFR BLD AUTO: 78.2 % — HIGH (ref 37–73)
PHOSPHATE SERPL-MCNC: 4 MG/DL — SIGNIFICANT CHANGE UP (ref 2.4–4.7)
PLATELET # BLD AUTO: 227 K/UL — SIGNIFICANT CHANGE UP (ref 150–400)
POTASSIUM SERPL-MCNC: 3.9 MMOL/L — SIGNIFICANT CHANGE UP (ref 3.5–5.3)
POTASSIUM SERPL-SCNC: 3.9 MMOL/L — SIGNIFICANT CHANGE UP (ref 3.5–5.3)
RBC # BLD: 4.73 M/UL — SIGNIFICANT CHANGE UP (ref 4.6–6.2)
RBC # FLD: 13.1 % — SIGNIFICANT CHANGE UP (ref 11–15.6)
SODIUM SERPL-SCNC: 139 MMOL/L — SIGNIFICANT CHANGE UP (ref 135–145)
WBC # BLD: 10.6 K/UL — SIGNIFICANT CHANGE UP (ref 4.8–10.8)
WBC # FLD AUTO: 10.6 K/UL — SIGNIFICANT CHANGE UP (ref 4.8–10.8)

## 2019-05-30 PROCEDURE — 36415 COLL VENOUS BLD VENIPUNCTURE: CPT

## 2019-05-30 PROCEDURE — 99232 SBSQ HOSP IP/OBS MODERATE 35: CPT

## 2019-05-30 PROCEDURE — 83735 ASSAY OF MAGNESIUM: CPT

## 2019-05-30 PROCEDURE — 80048 BASIC METABOLIC PNL TOTAL CA: CPT

## 2019-05-30 PROCEDURE — 83970 ASSAY OF PARATHORMONE: CPT

## 2019-05-30 PROCEDURE — 84100 ASSAY OF PHOSPHORUS: CPT

## 2019-05-30 PROCEDURE — 88307 TISSUE EXAM BY PATHOLOGIST: CPT

## 2019-05-30 PROCEDURE — 71045 X-RAY EXAM CHEST 1 VIEW: CPT

## 2019-05-30 PROCEDURE — 85027 COMPLETE CBC AUTOMATED: CPT

## 2019-05-30 RX ORDER — AZITHROMYCIN 500 MG/1
1 TABLET, FILM COATED ORAL
Qty: 4 | Refills: 0
Start: 2019-05-30 | End: 2019-06-02

## 2019-05-30 RX ORDER — DOCUSATE SODIUM 100 MG
1 CAPSULE ORAL
Qty: 60 | Refills: 0
Start: 2019-05-30

## 2019-05-30 RX ORDER — SENNA PLUS 8.6 MG/1
2 TABLET ORAL
Qty: 60 | Refills: 0
Start: 2019-05-30

## 2019-05-30 RX ADMIN — Medication 100 MILLIGRAM(S): at 13:16

## 2019-05-30 RX ADMIN — AZITHROMYCIN 500 MILLIGRAM(S): 500 TABLET, FILM COATED ORAL at 13:16

## 2019-05-30 RX ADMIN — Medication 100 MILLIGRAM(S): at 06:19

## 2019-05-30 NOTE — DISCHARGE NOTE NURSING/CASE MANAGEMENT/SOCIAL WORK - NSDCDPATPORTLINK_GEN_ALL_CORE
You can access the [facility], offered by [facility], by registering with the following website: [email or web address]

## 2019-05-30 NOTE — DISCHARGE NOTE PROVIDER - CARE PROVIDER_API CALL
Ric Edwards)  Otolaryngology  [address] [place] [address]  Phone: [phone]  Fax: [phone]  Follow Up Time: 2 weeks

## 2019-05-30 NOTE — DISCHARGE NOTE PROVIDER - HOSPITAL COURSE
23 year old male with history of thyroid cancer now s/p completion of thyroidectomy 5/29. Post-op complicated by desaturation to 80s so patient was admitted overnight for 23-hr observation. Patient doing well this morning. VSS. He is tolerating a regular diet, voiding spontaneously, ambulating, pain well controlled. Operating attending has cleared patient for discharge this morning. He is to follow up in clinic in 2 weeks following discharge.

## 2019-05-30 NOTE — PROGRESS NOTE ADULT - ASSESSMENT
23yr old male with no significant medical history, was scheduled for an elective thyroidectomy. He is s/p completion thyroidectomy under GA for thyroid cancer. Per RN and surgeon, pt has episodes of Oxygen desaturations to 88 and 90s on RA requiring nasal Oxygen, with difficulty weaning off O2. Which lead to work up including CXR - showed B/l infiltrate Rt >Lt.  most likely 2/2 an aspiration event.     # Post operative hypoxia - most likely 2/2 Aspiration event , no more desaturations on  per RN  not requiring any O2 support or nebs  stable     # s/p thyroidectomy   pain control and rest management per ENT    Medically stable for discharge.

## 2019-05-30 NOTE — PROGRESS NOTE ADULT - SUBJECTIVE AND OBJECTIVE BOX
CONSUELO HURD    [id number]    23y      Male    CC: Post operative cough  S/p thyroidectomy POD#1  Feels well, denies any more episode of cough with wheezing,   had one episode of small hemoptysis today morning, denies any SOB  throat pain is fairly controlled, able to tolerate PO    INTERVAL HPI/OVERNIGHT EVENTS: no acute events    REVIEW OF SYSTEMS:    CONSTITUTIONAL: No fever, fatigue  RESPIRATORY: No cough, wheezing,+ hemoptysis; No shortness of breath  CARDIOVASCULAR: No chest pain, palpitations  GASTROINTESTINAL: No abdominal or epigastric pain. No nausea, vomiting        Vital Signs Last 24 Hrs  T(C): 36.9 (30 May 2019 08:44), Max: 37 (29 May 2019 16:15)  T(F): 98.5 (30 May 2019 08:44), Max: 98.6 (29 May 2019 16:15)  HR: 96 (30 May 2019 08:44) (88 - 112)  BP: 118/54 (30 May 2019 08:44) (118/54 - 140/78)  BP(mean): --  RR: 18 (30 May 2019 08:44) (17 - 33)  SpO2: 94% (30 May 2019 08:44) (93% - 100%)    PHYSICAL EXAM:    GENERAL: NAD, well-groomed  HEENT: PERRL, +EOMI  NECK: soft, Supple  CHEST/LUNG: Clear to percussion bilaterally; No wheezing  HEART: S1S2+, Regular rate and rhythm; No murmurs  EXTREMITIES:  No clubbing, cyanosis, or edema  SKIN: No rashes or lesions  NEURO: AAOX3        LABS:                        14.8   10.6  )-----------( 227      ( 30 May 2019 08:39 )             42.1     05-30    139  |  103  |  12.0  ----------------------------<  119<H>  3.9   |  25.0  |  0.74    Ca    8.9      30 May 2019 08:39  Phos  4.0     05-30  Mg     1.8     05-30              MEDICATIONS  (STANDING):  azithromycin   Tablet 500 milliGRAM(s) Oral daily  docusate sodium 100 milliGRAM(s) Oral every 8 hours  enoxaparin Injectable 40 milliGRAM(s) SubCutaneous every 24 hours  senna 2 Tablet(s) Oral at bedtime    MEDICATIONS  (PRN):  ondansetron Injectable 4 milliGRAM(s) IV Push every 6 hours PRN Nausea  oxyCODONE    5 mG/acetaminophen 325 mG 1 Tablet(s) Oral every 6 hours PRN Moderate Pain (4 - 6)  oxyCODONE    5 mG/acetaminophen 325 mG 2 Tablet(s) Oral every 6 hours PRN Severe Pain (7 - 10)      RADIOLOGY & ADDITIONAL TESTS:

## 2019-06-07 LAB — SURGICAL PATHOLOGY STUDY: SIGNIFICANT CHANGE UP

## 2019-08-04 NOTE — DISCHARGE NOTE NURSING/CASE MANAGEMENT/SOCIAL WORK - NSDPLANG ASIS_GEN_ALL_CORE
No [Const] pt mildly uncomfortable, tired  [HEENT] PERRL, EOM, dry mucus membranes, no conjunctival injection or discharge  [Neck] Supple, trachea midline  [CV] +S1/S2, no m/r/g appreciated  [Lungs] CTABL, no adventitious lung sounds  [Abd] soft, non-tender, nondistended in all 4 quadrants  [MSK] 5/5 UE and LR str BL  [Skin] warm, dry, well-perfused  [Neuro] A&Ox3, CN II-XII intact, gait normal

## 2021-08-04 ENCOUNTER — APPOINTMENT (OUTPATIENT)
Dept: DISASTER EMERGENCY | Facility: OTHER | Age: 26
End: 2021-08-04

## 2021-08-06 ENCOUNTER — APPOINTMENT (OUTPATIENT)
Age: 26
End: 2021-08-06
Payer: COMMERCIAL

## 2021-08-06 PROCEDURE — 0011A: CPT

## 2021-08-25 ENCOUNTER — APPOINTMENT (OUTPATIENT)
Dept: DISASTER EMERGENCY | Facility: OTHER | Age: 26
End: 2021-08-25

## 2021-09-03 ENCOUNTER — APPOINTMENT (OUTPATIENT)
Age: 26
End: 2021-09-03
Payer: COMMERCIAL

## 2021-09-03 PROCEDURE — 0012A: CPT

## 2022-01-17 ENCOUNTER — LABORATORY RESULT (OUTPATIENT)
Age: 27
End: 2022-01-17

## 2022-01-17 ENCOUNTER — APPOINTMENT (OUTPATIENT)
Dept: FAMILY MEDICINE | Facility: CLINIC | Age: 27
End: 2022-01-17
Payer: COMMERCIAL

## 2022-01-17 ENCOUNTER — TRANSCRIPTION ENCOUNTER (OUTPATIENT)
Age: 27
End: 2022-01-17

## 2022-01-17 ENCOUNTER — NON-APPOINTMENT (OUTPATIENT)
Age: 27
End: 2022-01-17

## 2022-01-17 VITALS
OXYGEN SATURATION: 98 % | SYSTOLIC BLOOD PRESSURE: 124 MMHG | WEIGHT: 224.06 LBS | RESPIRATION RATE: 16 BRPM | BODY MASS INDEX: 33.96 KG/M2 | TEMPERATURE: 97.2 F | HEART RATE: 78 BPM | DIASTOLIC BLOOD PRESSURE: 78 MMHG | HEIGHT: 68 IN

## 2022-01-17 DIAGNOSIS — Z78.9 OTHER SPECIFIED HEALTH STATUS: ICD-10-CM

## 2022-01-17 DIAGNOSIS — Z85.850 PERSONAL HISTORY OF MALIGNANT NEOPLASM OF THYROID: ICD-10-CM

## 2022-01-17 DIAGNOSIS — Z82.49 FAMILY HISTORY OF ISCHEMIC HEART DISEASE AND OTHER DISEASES OF THE CIRCULATORY SYSTEM: ICD-10-CM

## 2022-01-17 DIAGNOSIS — E89.0 POSTPROCEDURAL HYPOTHYROIDISM: ICD-10-CM

## 2022-01-17 DIAGNOSIS — Z00.00 ENCOUNTER FOR GENERAL ADULT MEDICAL EXAMINATION W/OUT ABNORMAL FINDINGS: ICD-10-CM

## 2022-01-17 PROCEDURE — 93000 ELECTROCARDIOGRAM COMPLETE: CPT

## 2022-01-17 PROCEDURE — 99385 PREV VISIT NEW AGE 18-39: CPT | Mod: 25

## 2022-01-18 LAB
ALBUMIN SERPL ELPH-MCNC: 4.6 G/DL
ALP BLD-CCNC: 83 U/L
ALT SERPL-CCNC: 55 U/L
ANION GAP SERPL CALC-SCNC: 16 MMOL/L
APPEARANCE: ABNORMAL
AST SERPL-CCNC: 30 U/L
BASOPHILS # BLD AUTO: 0.09 K/UL
BASOPHILS NFR BLD AUTO: 1 %
BILIRUB SERPL-MCNC: 0.6 MG/DL
BILIRUBIN URINE: NEGATIVE
BLOOD URINE: NEGATIVE
BUN SERPL-MCNC: 10 MG/DL
CALCIUM SERPL-MCNC: 9.8 MG/DL
CHLORIDE SERPL-SCNC: 102 MMOL/L
CHOLEST SERPL-MCNC: 221 MG/DL
CO2 SERPL-SCNC: 24 MMOL/L
COLOR: ABNORMAL
CREAT SERPL-MCNC: 1.11 MG/DL
EOSINOPHIL # BLD AUTO: 0.15 K/UL
EOSINOPHIL NFR BLD AUTO: 1.7 %
ERYTHROCYTE [SEDIMENTATION RATE] IN BLOOD BY WESTERGREN METHOD: 11 MM/HR
ESTIMATED AVERAGE GLUCOSE: 114 MG/DL
FERRITIN SERPL-MCNC: 288 NG/ML
FOLATE SERPL-MCNC: 3.2 NG/ML
GLUCOSE QUALITATIVE U: NEGATIVE
GLUCOSE SERPL-MCNC: 120 MG/DL
HBA1C MFR BLD HPLC: 5.6 %
HCT VFR BLD CALC: 47.3 %
HDLC SERPL-MCNC: 51 MG/DL
HGB BLD-MCNC: 16.1 G/DL
IMM GRANULOCYTES NFR BLD AUTO: 0.2 %
IRON SATN MFR SERPL: 28 %
IRON SERPL-MCNC: 88 UG/DL
KETONES URINE: NEGATIVE
LDLC SERPL CALC-MCNC: 126 MG/DL
LEUKOCYTE ESTERASE URINE: NEGATIVE
LYMPHOCYTES # BLD AUTO: 2.22 K/UL
LYMPHOCYTES NFR BLD AUTO: 25.6 %
MAN DIFF?: NORMAL
MCHC RBC-ENTMCNC: 30.1 PG
MCHC RBC-ENTMCNC: 34 GM/DL
MCV RBC AUTO: 88.6 FL
MONOCYTES # BLD AUTO: 0.65 K/UL
MONOCYTES NFR BLD AUTO: 7.5 %
NEUTROPHILS # BLD AUTO: 5.54 K/UL
NEUTROPHILS NFR BLD AUTO: 64 %
NITRITE URINE: NEGATIVE
NONHDLC SERPL-MCNC: 170 MG/DL
PH URINE: 7.5
PLATELET # BLD AUTO: 266 K/UL
POTASSIUM SERPL-SCNC: 4.1 MMOL/L
PROT SERPL-MCNC: 7.6 G/DL
PROTEIN URINE: ABNORMAL
RBC # BLD: 5.34 M/UL
RBC # FLD: 12.8 %
SODIUM SERPL-SCNC: 142 MMOL/L
SPECIFIC GRAVITY URINE: 1.03
TIBC SERPL-MCNC: 321 UG/DL
TRIGL SERPL-MCNC: 221 MG/DL
UIBC SERPL-MCNC: 232 UG/DL
UROBILINOGEN URINE: NORMAL
VIT B12 SERPL-MCNC: 285 PG/ML
WBC # FLD AUTO: 8.67 K/UL

## 2022-01-18 NOTE — COUNSELING
[Fall prevention counseling provided] : Fall prevention counseling provided [Adequate lighting] : Adequate lighting [No throw rugs] : No throw rugs [Use proper foot wear] : Use proper foot wear [Behavioral health counseling provided] : Behavioral health counseling provided [Sleep ___ hours/day] : Sleep [unfilled] hours/day [Engage in a relaxing activity] : Engage in a relaxing activity [Plan in advance] : Plan in advance [AUDIT-C Screening administered and reviewed] : AUDIT-C Screening administered and reviewed [Potential consequences of obesity discussed] : Potential consequences of obesity discussed [Benefits of weight loss discussed] : Benefits of weight loss discussed [Encouraged to increase physical activity] : Encouraged to increase physical activity [Weigh Self Weekly] : weigh self weekly [Decrease Portions] : decrease portions [None] : None [Good understanding] : Patient has a good understanding of lifestyle changes and steps needed to achieve self management goal [FreeTextEntry2] : never smoker

## 2022-01-18 NOTE — HEALTH RISK ASSESSMENT
[Very Good] : ~his/her~  mood as very good [Never] : Never [Yes] : Yes [Monthly or less (1 pt)] : Monthly or less (1 point) [1 or 2 (0 pts)] : 1 or 2 (0 points) [Never (0 pts)] : Never (0 points) [No] : In the past 12 months have you used drugs other than those required for medical reasons? No [No falls in past year] : Patient reported no falls in the past year [0] : 2) Feeling down, depressed, or hopeless: Not at all (0) [PHQ-2 Negative - No further assessment needed] : PHQ-2 Negative - No further assessment needed [Patient declined Low Dose CT Scan] : Patient declined Low Dose CT Scan [No Retinopathy] : No retinopathy [HIV test declined] : HIV test declined [Hepatitis C test declined] : Hepatitis C test declined [None] : None [With Family] : lives with family [Employed] : employed [College] : College [Sexually Active] : sexually active [Fully functional (bathing, dressing, toileting, transferring, walking, feeding)] : Fully functional (bathing, dressing, toileting, transferring, walking, feeding) [Fully functional (using the telephone, shopping, preparing meals, housekeeping, doing laundry, using] : Fully functional and needs no help or supervision to perform IADLs (using the telephone, shopping, preparing meals, housekeeping, doing laundry, using transportation, managing medications and managing finances) [Reports normal functional visual acuity (ie: able to read med bottle)] : Reports normal functional visual acuity [Smoke Detector] : smoke detector [Carbon Monoxide Detector] : carbon monoxide detector [Safety elements used in home] : safety elements used in home [Seat Belt] :  uses seat belt [Sunscreen] : uses sunscreen [Patient/Caregiver not ready to engage] : , patient/caregiver not ready to engage [I will adhere to the patient's wishes.] : I will adhere to the patient's wishes. [Time Spent: ___ minutes] : Time Spent: [unfilled] minutes [# of Members in Household ___] :  household currently consist of [unfilled] member(s) [Single] : single [Audit-CScore] : 1 [de-identified] : average [de-identified] : average [Spooner Healthgo] : 6 [[id number]] : 0 [LowDoseCTScan] : 01/22 [EyeExamDate] : 01/22 [Change in mental status noted] : No change in mental status noted [Language] : denies difficulty with language [Behavior] : denies difficulty with behavior [Learning/Retaining New Information] : denies difficulty learning/retaining new information [Handling Complex Tasks] : denies difficulty handling complex tasks [Reasoning] : denies difficulty with reasoning [Spatial Ability and Orientation] : denies difficulty with spatial ability and orientation [High Risk Behavior] : no high risk behavior [Reports changes in hearing] : Reports no changes in hearing [Reports changes in vision] : Reports no changes in vision [Reports changes in dental health] : Reports no changes in dental health [Guns at Home] : no guns at home [Travel to Developing Areas] : does not  travel to developing areas [TB Exposure] : is not being exposed to tuberculosis [Caregiver Concerns] : does not have caregiver concerns [MammogramComments] : male  [PapSmearComments] : male  [BoneDensityComments] : not indicated [ColonoscopyComments] : not indicated  [HIVComments] : 01/22 [HepatitisCComments] : 01/22 [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

## 2022-01-18 NOTE — HISTORY OF PRESENT ILLNESS
[Snoring] : snoring [TextBox_19] : SUSPECTED KAMINI  [FreeTextEntry1] : new pt CPE - no complaints \par [facility]  total thyroidectomy approx 2 years ago due to local nonmetastatic malignancy - sx performed at [place]

## 2022-01-18 NOTE — ASSESSMENT
[FreeTextEntry1] : new pt CPE - 27 y/o M with PMH thyroid CA s/p total thyroidectomy approx 2 years ago - currently on synthroid. no complaints at this time \par care plan initiated \par labs and ekg done

## 2022-01-20 LAB — TSH SERPL-ACNC: 0.01 UIU/ML

## 2022-02-18 ENCOUNTER — APPOINTMENT (OUTPATIENT)
Age: 27
End: 2022-02-18
Payer: MEDICAID

## 2022-02-18 ENCOUNTER — OUTPATIENT (OUTPATIENT)
Dept: OUTPATIENT SERVICES | Facility: HOSPITAL | Age: 27
LOS: 1 days | End: 2022-02-18
Payer: COMMERCIAL

## 2022-02-18 DIAGNOSIS — G47.33 OBSTRUCTIVE SLEEP APNEA (ADULT) (PEDIATRIC): ICD-10-CM

## 2022-02-18 DIAGNOSIS — Z98.890 OTHER SPECIFIED POSTPROCEDURAL STATES: Chronic | ICD-10-CM

## 2022-02-18 PROCEDURE — 95810 POLYSOM 6/> YRS 4/> PARAM: CPT

## 2022-02-18 PROCEDURE — 95810 POLYSOM 6/> YRS 4/> PARAM: CPT | Mod: 26

## 2022-03-24 ENCOUNTER — APPOINTMENT (OUTPATIENT)
Dept: PULMONOLOGY | Facility: CLINIC | Age: 27
End: 2022-03-24
Payer: MEDICAID

## 2022-03-24 VITALS
DIASTOLIC BLOOD PRESSURE: 82 MMHG | OXYGEN SATURATION: 98 % | RESPIRATION RATE: 16 BRPM | BODY MASS INDEX: 33.95 KG/M2 | SYSTOLIC BLOOD PRESSURE: 132 MMHG | WEIGHT: 224 LBS | HEIGHT: 68 IN | HEART RATE: 84 BPM

## 2022-03-24 DIAGNOSIS — R06.83 SNORING: ICD-10-CM

## 2022-03-24 PROCEDURE — 99203 OFFICE O/P NEW LOW 30 MIN: CPT

## 2022-03-24 NOTE — PHYSICAL EXAM
[No Acute Distress] : no acute distress [Elongated Uvula] : elongated uvula [Enlarged Base of the Tongue] : enlarged base of the tongue [II] : Mallampati Class: II [Normal Appearance] : normal appearance [Neck Circumference: ___] : neck circumference: [unfilled] [No Neck Mass] : no neck mass [TextBox_2] : obese

## 2022-03-24 NOTE — COUNSELING
[Potential consequences of obesity discussed] : Potential consequences of obesity discussed [Benefits of weight loss discussed] : Benefits of weight loss discussed [Encouraged to maintain food diary] : Encouraged to maintain food diary [Structured Weight Management Program suggested:] : Structured weight management program suggested [Encouraged to increase physical activity] : Encouraged to increase physical activity [Encouraged to use exercise tracking device] : Encouraged to use exercise tracking device

## 2022-03-24 NOTE — HISTORY OF PRESENT ILLNESS
[Obstructive Sleep Apnea] : obstructive sleep apnea [Awakes Unrefreshed] : awakes unrefreshed [Awakes with Dry Mouth] : awakes with dry mouth [Daytime Somnolence] : daytime somnolence [Hypnopompic Hallucinations] : hypnopompic hallucinations [Snoring] : snoring [TextBox_4] : Obese male snorer sent for sleep testing by Dr Sanchez here for sleep evaluation and management \par Snoring and Excessive Daytime Somnolence\par Claustrophobic - doesn't believe he could tolerate CPAP  [ESS] : 9

## 2022-06-06 ENCOUNTER — APPOINTMENT (OUTPATIENT)
Dept: UROLOGY | Facility: CLINIC | Age: 27
End: 2022-06-06
Payer: COMMERCIAL

## 2022-06-06 DIAGNOSIS — N50.89 OTHER SPECIFIED DISORDERS OF THE MALE GENITAL ORGANS: ICD-10-CM

## 2022-06-06 PROCEDURE — 99203 OFFICE O/P NEW LOW 30 MIN: CPT

## 2022-06-06 NOTE — PHYSICAL EXAM
[General Appearance - Well Developed] : well developed [General Appearance - Well Nourished] : well nourished [Normal Appearance] : normal appearance [Well Groomed] : well groomed [General Appearance - In No Acute Distress] : no acute distress [Edema] : no peripheral edema [] : no respiratory distress [Respiration, Rhythm And Depth] : normal respiratory rhythm and effort [Exaggerated Use Of Accessory Muscles For Inspiration] : no accessory muscle use [Urethral Meatus] : meatus normal [Urinary Bladder Findings] : the bladder was normal on palpation [Scrotum] : the scrotum was normal [Rectal Exam - Seminal Vesicles] : the seminal vesicles were normal [Epididymis] : the epididymides were normal [Testes Tenderness] : no tenderness of the testes [FreeTextEntry1] : small area on the left lateral testicle, unclear if mass or just some cystic finding, only 2-3 mm. not sensitive. [Normal Station and Gait] : the gait and station were normal for the patient's age [Oriented To Time, Place, And Person] : oriented to person, place, and time [Affect] : the affect was normal [Mood] : the mood was normal [Not Anxious] : not anxious [Inguinal Lymph Nodes Enlarged Bilaterally] : inguinal

## 2022-06-06 NOTE — HISTORY OF PRESENT ILLNESS
[FreeTextEntry1] : 25 yo M for initial consultation\par PMH: hypothyroidism due to thyroidectomy\par PSH: thyroidectomy due to cancer, in follow up, hernia repair\par allergy to penicillin\par not a smoker\par \par no previous urologic history\par felt a lump on left testicle a month ago\par did not change in size, asymptomatic\par no urinary complains\par \par on exam today a very small area on the left lateral testicle, unclear if mass or just some cystic finding, only 2-3 mm. not sensitive.\par discussed the low risk of prostate cancer, the finding is very small and unclear, and it has not grown in 1 month time. still explained the importance of ruling out testicular cancer.\par \par plan:\par - ordered ultrasound\par - send AFP, HCG, LDH\par - will update results on the phone and update for the need of ay further work up\par - will schedule appointment next week just in case

## 2022-06-06 NOTE — ASSESSMENT
[FreeTextEntry1] : plan:\par - ordered ultrasound\par - send AFP, HCG, LDH\par - will update results on the phone and update for the need of ay further work up\par - will schedule appointment next week just in case

## 2022-06-13 ENCOUNTER — APPOINTMENT (OUTPATIENT)
Dept: UROLOGY | Facility: CLINIC | Age: 27
End: 2022-06-13

## 2022-06-21 ENCOUNTER — APPOINTMENT (OUTPATIENT)
Dept: PULMONOLOGY | Facility: CLINIC | Age: 27
End: 2022-06-21
Payer: COMMERCIAL

## 2022-06-21 VITALS
DIASTOLIC BLOOD PRESSURE: 80 MMHG | HEART RATE: 97 BPM | RESPIRATION RATE: 16 BRPM | WEIGHT: 224 LBS | BODY MASS INDEX: 33.95 KG/M2 | HEIGHT: 68 IN | OXYGEN SATURATION: 98 % | SYSTOLIC BLOOD PRESSURE: 122 MMHG

## 2022-06-21 PROCEDURE — 99213 OFFICE O/P EST LOW 20 MIN: CPT

## 2022-06-21 NOTE — REASON FOR VISIT
Pt called requesting a refill for Nystatin and triamcinolone for vaginal itching.  She was last seen by Dr. Reece 09/18/19.  She had refills for these meds sent 05/28/19 and 08/02/19.  She is scheduled for a problem visit on 12/06/19 for recurrent vaginal itching.  No discharge.  Please advise.   [Follow-Up] : a follow-up visit [Sleep Apnea] : sleep apnea

## 2022-06-21 NOTE — ASSESSMENT
[FreeTextEntry1] : Obesity \par Moderate KAMINI - severe in REM sleep - responsive to oral appliance therapy

## 2022-06-21 NOTE — HISTORY OF PRESENT ILLNESS
[Obstructive Sleep Apnea] : obstructive sleep apnea [Awakes Unrefreshed] : awakes unrefreshed [Awakes with Dry Mouth] : awakes with dry mouth [Daytime Somnolence] : daytime somnolence [Hypnopompic Hallucinations] : hypnopompic hallucinations [Snoring] : snoring [TextBox_4] : Obese male snorer sent for sleep testing by Dr Sanchez here for sleep evaluation and management \par Snoring and Excessive Daytime Somnolence\par Claustrophobic - doesn't believe he could tolerate CPAP \par \par 6/21/22\par Compliant with and benefiting from nocturnal oral sleep appliance\par Not snoring - refreshed each am \par  [ESS] : 9

## 2022-06-22 ENCOUNTER — APPOINTMENT (OUTPATIENT)
Dept: FAMILY MEDICINE | Facility: CLINIC | Age: 27
End: 2022-06-22

## 2022-07-22 ENCOUNTER — APPOINTMENT (OUTPATIENT)
Dept: ENDOCRINOLOGY | Facility: CLINIC | Age: 27
End: 2022-07-22

## 2022-07-22 VITALS
WEIGHT: 235 LBS | HEART RATE: 105 BPM | OXYGEN SATURATION: 97 % | BODY MASS INDEX: 35.61 KG/M2 | HEIGHT: 68 IN | SYSTOLIC BLOOD PRESSURE: 130 MMHG | DIASTOLIC BLOOD PRESSURE: 74 MMHG

## 2022-07-22 PROCEDURE — 99204 OFFICE O/P NEW MOD 45 MIN: CPT

## 2022-07-22 NOTE — REASON FOR VISIT
[Initial Evaluation] : an initial evaluation [Hypothyroidism] : hypothyroidism [Thyroid Cancer] : thyroid cancer

## 2022-07-22 NOTE — PHYSICAL EXAM
[Alert] : alert [Obese] : obese [No LAD] : no lymphadenopathy [Well Healed Scar] : well healed scar [No Accessory Muscle Use] : no accessory muscle use [Clear to Auscultation] : lungs were clear to auscultation bilaterally [Normal S1, S2] : normal S1 and S2 [Normal Rate] : heart rate was normal [No Edema] : no peripheral edema [Normal Reflexes] : deep tendon reflexes were 2+ and symmetric [No Tremors] : no tremors [Oriented x3] : oriented to person, place, and time [Normal Affect] : the affect was normal [Normal Insight/Judgement] : insight and judgment were intact [Normal Mood] : the mood was normal

## 2022-07-29 ENCOUNTER — TRANSCRIPTION ENCOUNTER (OUTPATIENT)
Age: 27
End: 2022-07-29

## 2022-07-29 NOTE — HISTORY OF PRESENT ILLNESS
[FreeTextEntry1] : self referred for thyroid cancer\par \par In 2019 he was diagnosed with thyroid cancer. At first underwent hemithyroidectomy and then underwent completion thyroidectomy later that year at [facility] with Dr Edwards.  No post surgical complications.  He did not need COLIN. He has been on varying doses of  thyroid hormone since 2019,  For the past year he has been on LT4 200 mcg 1tab Mon-Fri, 1/2 tab Sat-Sun. No signs of recurrence on sonogram or blood test.  He takes his thyroid medication daily on empty stomach.\par \par No family thyriod cancer, sister with hashimoto's thyroid disease\par He feels well today.

## 2022-07-29 NOTE — ASSESSMENT
[FreeTextEntry1] : 26 year old male  diagnosed with thyroid cancer in 2019. At first underwent hemithyroidectomy and then underwent completion thyroidectomy later that year at [facility] with Dr Edwards.  No post surgical complications.  He did not need COLIN. He has been on varying doses of  thyroid hormone since 2019,  For the past year he has been on LT4 200 mcg 1tab Mon-Fri, 1/2 tab Sat-Sun.\par - TSH suppressed on labs 1/2022, will need to review thyroid surgical pathology report to determine degree of TSH suppression\par - cont LT4 200 mcg daily for now\par - repeat TSH, need to check Tg and Tgab levels\par - check neck sonogram\par - records from prior endo, Dr Elizondo\par - records from ENT surgeon, Dr. Edwards

## 2022-07-29 NOTE — REVIEW OF SYSTEMS
[Heat Intolerance] : heat intolerance [Dysphagia] : no dysphagia [Neck Pain] : no neck pain [Dysphonia] : no dysphonia [Palpitations] : no palpitations [Fast Heart Rate] : heart rate is not fast [Constipation] : no constipation [Diarrhea] : no diarrhea [Dizziness] : no dizziness [Tremors] : no tremors [Insomnia] : no insomnia [Anxiety] : no anxiety [FreeTextEntry2] : weight stable

## 2022-08-02 ENCOUNTER — TRANSCRIPTION ENCOUNTER (OUTPATIENT)
Age: 27
End: 2022-08-02

## 2022-08-10 ENCOUNTER — OUTPATIENT (OUTPATIENT)
Dept: OUTPATIENT SERVICES | Facility: HOSPITAL | Age: 27
LOS: 1 days | End: 2022-08-10
Payer: COMMERCIAL

## 2022-08-10 DIAGNOSIS — Z98.890 OTHER SPECIFIED POSTPROCEDURAL STATES: Chronic | ICD-10-CM

## 2022-08-10 DIAGNOSIS — G47.33 OBSTRUCTIVE SLEEP APNEA (ADULT) (PEDIATRIC): ICD-10-CM

## 2022-08-10 PROCEDURE — 95800 SLP STDY UNATTENDED: CPT

## 2022-09-01 NOTE — BRIEF OPERATIVE NOTE - ELECTIVE PROCEDURE
Physical Therapy Visit    Referred by: Derrek Magaña MD; Medical Diagnosis (from order):    Visit: 9    Visit Type: Daily Treatment Note    SUBJECTIVE                                                                                                               Patient reports that he golfed yesterday and it went quite well. He has some stiffness this morning. He wore an over the counter lace up brace.   Functional Change: Increasing his dynamic activities.  Pain / Symptoms:  Pain rating (out of 10): Current: 0     OBJECTIVE                                                                                                                           Lower Extremity Y Balance:  Left Trial 1: Anterior:  52 cm, Posterior-medial:  92 cm, Posterior-lateral:  96 cm       Trial 2: Anterior:  50 cm, Posterior-medial:  89 cm, Posterior-lateral:  98 cm      Trial 3: Anterior:  53 cm, Posterior-medial:  90 cm, Posterior-lateral:  100 cm   Right Trial 1: Anterior:  45 cm, Posterior-medial:  79 cm, Posterior-lateral:  100 cm      Trial 2: Anterior:  46 cm, Posterior-medial:  66 cm, Posterior-lateral:  96 cm      Trial 3: Anterior:  47 cm, Posterior-medial:  82 cm, Posterior-lateral:  96 cm   Difference between best trial of left and best trial of right (greater than 4 cm difference indicates a risk factor for injury):   Anterior difference: 6 cm    Posterior-lateral difference: 0 cm    Posterior-medial difference: 10 cm   Comments / Details: Shoes off, hands on hips  TREATMENT                                                                                                                  Therapeutic Exercise:  Elliptical x 6 minutes no resistance  Time taken performing Y balance test  Tank push level 1 270# 2x 3 laps  Holding two 15# kettlebell double leg calf raises 2 x 20  Step ups on step 360 with 12# kettle bell 2 x 15  Holding two 12# kettlebells walking lunges x 50'  Holding two 12# kettlebells side steps x  25'/direction    Step down from 8 inch step x 15  Closed chain dorsiflexion stretch 2 x 10    Agility ladder drills completed two times each to incorporate variable velocity and intro to impact training.  The following drills were completed at 25-50% speed  Two feet in each hole, one foot in each hole, high knees      Skilled input: verbal instruction/cues and tactile instruction/cues    Writer verbally educated and received verbal consent for hand placement, positioning of patient, and techniques to be performed today from patient for clothing adjustments for techniques, therapist position for techniques, hand placement and palpation for techniques and modality application as described above and how they are pertinent to the patient's plan of care.    Home Exercise Program/Education Materials: Access Code: [email or web address]  URL: [email or web address]  Date: 08/31/2022  Prepared by: Louie Mcdonald    Exercises  · Standing Soleus Stretch at Counter - 1 x daily - 7 x weekly - 2 sets - 15 reps - 5 hold  · Forward Step Down with Heel Tap and Counter Support - 1 x daily - 7 x weekly - 2 sets - 15 reps  · Mini Lunge - 1 x daily - 7 x weekly - 2 sets - 15 reps  · Heel Raise on Step - 1 x daily - 7 x weekly - 2 sets - 15 reps       ASSESSMENT                                                                                                             Patient tolerated treatment session well with no change in symptoms following treatment.  Emphasis of treatment session introduction to higher intensity loading and impact. Patient continues to have difficulty with push off strength, but shows improvement in single leg stability as noted by side to side symmetry with lateral reaching during Y balance testing this visit..  Patient will continue to benefit from skilled physical therapy to address objective deficits and return to safe daily and functional activities.     Pain/symptoms after session (out of 10):  0  Patient Education:   Results of above outlined education: Needs reinforcement and Verbalizes understanding      PLAN                                                                                                                           Suggestions for next session as indicated: Progress per plan of care, continue progressive loading as tolerated, continue progressing range of motion and strength.         Therapy procedure time and total treatment time can be found documented on the Time Entry flowsheet   Yes

## 2022-09-06 ENCOUNTER — NON-APPOINTMENT (OUTPATIENT)
Age: 27
End: 2022-09-06

## 2022-09-23 ENCOUNTER — APPOINTMENT (OUTPATIENT)
Dept: PULMONOLOGY | Facility: CLINIC | Age: 27
End: 2022-09-23

## 2022-09-23 VITALS
RESPIRATION RATE: 16 BRPM | HEIGHT: 68 IN | WEIGHT: 235 LBS | HEART RATE: 82 BPM | DIASTOLIC BLOOD PRESSURE: 72 MMHG | SYSTOLIC BLOOD PRESSURE: 140 MMHG | BODY MASS INDEX: 35.61 KG/M2 | OXYGEN SATURATION: 96 %

## 2022-09-23 DIAGNOSIS — G47.33 OBSTRUCTIVE SLEEP APNEA (ADULT) (PEDIATRIC): ICD-10-CM

## 2022-09-23 DIAGNOSIS — E66.9 OBESITY, UNSPECIFIED: ICD-10-CM

## 2022-09-23 PROCEDURE — 99213 OFFICE O/P EST LOW 20 MIN: CPT

## 2022-09-23 NOTE — HISTORY OF PRESENT ILLNESS
[Obstructive Sleep Apnea] : obstructive sleep apnea [Awakes Unrefreshed] : awakes unrefreshed [Awakes with Dry Mouth] : awakes with dry mouth [Daytime Somnolence] : daytime somnolence [Hypnopompic Hallucinations] : hypnopompic hallucinations [Snoring] : snoring [TextBox_4] : Obese male snorer sent for sleep testing by Dr Sanchez here for sleep evaluation and management \par Snoring and Excessive Daytime Somnolence\par Claustrophobic - doesn't believe he could tolerate CPAP \par \par 6/21/22\par Compliant with and benefiting from nocturnal oral sleep appliance\par Not snoring - refreshed each am \par \par 9/23/22\par Compliant with and benefiting from nocturnal oral sleep appliance\par Not snoring - refreshed each am \par  [ESS] : 9

## 2022-09-23 NOTE — ASSESSMENT
[FreeTextEntry1] : Obesity \par Moderate KAMINI - severe in REM sleep - responsive to oral appliance therapy\par Needs to lose weight , avoid supine sleep and Have his appliance adjusted further

## 2022-10-27 NOTE — PATIENT PROFILE ADULT - DO YOU WANT TO COMPLETE THE HCP AND NAME A HEALTH CARE AGENT
"Anesthesia Release from PACU Note    Patient: Chasity Blake    Procedure(s) Performed: Procedure(s) (LRB):  COLONOSCOPY (N/A)    Anesthesia type: MAC    Post pain: Adequate analgesia    Post assessment: no apparent anesthetic complications    Last Vitals:   Visit Vitals  /75 (BP Location: Left arm, Patient Position: Lying)   Pulse 68   Temp 36.2 °C (97.2 °F) (Temporal)   Resp 14   Ht 5' 4" (1.626 m)   Wt 86.2 kg (190 lb)   SpO2 97%   Breastfeeding No   BMI 32.61 kg/m²       Post vital signs: stable    Level of consciousness: awake    Nausea/Vomiting: no nausea/no vomiting    Complications: none    Airway Patency: patent    Respiratory: unassisted    Cardiovascular: stable and blood pressure at baseline    Hydration: euvolemic  " no

## 2022-11-23 ENCOUNTER — APPOINTMENT (OUTPATIENT)
Dept: ENDOCRINOLOGY | Facility: CLINIC | Age: 27
End: 2022-11-23

## 2022-11-23 VITALS
DIASTOLIC BLOOD PRESSURE: 82 MMHG | OXYGEN SATURATION: 98 % | BODY MASS INDEX: 34.4 KG/M2 | SYSTOLIC BLOOD PRESSURE: 122 MMHG | HEIGHT: 68 IN | WEIGHT: 227 LBS | HEART RATE: 97 BPM

## 2022-11-23 LAB — TSH SERPL-ACNC: 0.02

## 2022-11-23 PROCEDURE — 99214 OFFICE O/P EST MOD 30 MIN: CPT

## 2022-11-23 NOTE — REVIEW OF SYSTEMS
[Heat Intolerance] : heat intolerance [Recent Weight Loss (___ Lbs)] : recent weight loss: [unfilled] lbs [Dysphagia] : no dysphagia [Neck Pain] : no neck pain [Dysphonia] : no dysphonia [Palpitations] : no palpitations [Fast Heart Rate] : heart rate is not fast [Constipation] : no constipation [Diarrhea] : no diarrhea [Dizziness] : no dizziness [Tremors] : no tremors [Insomnia] : no insomnia [Anxiety] : no anxiety [FreeTextEntry2] : weight stable

## 2022-11-23 NOTE — ASSESSMENT
[FreeTextEntry1] : 26 year old male  diagnosed with thyroid cancer in 2019. At first underwent hemithyroidectomy and then underwent completion thyroidectomy later that year at [facility] with Dr Edwards. Path: s/p total thyroidectomy, no adenopathy.  No Figueredo needed\par - VALERIANO guidelines reviewed - low risk of recurrence based on pathology, to date KIMANI on sonogram and recently with low Tg levels with neg Tg ab, excellent response to therapy, Goal TSH 0.5-2\par - updated labs needed, will adjust Lt4 dose\par - neck sono yearly\par - cont to monitor unstim Tg levels\par \par \par \par \par \par

## 2022-11-23 NOTE — REASON FOR VISIT
[Hypothyroidism] : hypothyroidism [Thyroid Cancer] : thyroid cancer [Follow - Up] : a follow-up visit

## 2022-11-23 NOTE — HISTORY OF PRESENT ILLNESS
[FreeTextEntry1] : Interval hx - no changes, no issues\par \par In 2019 he was diagnosed with thyroid cancer. At first underwent hemithyroidectomy and then underwent completion thyroidectomy later that year at [facility] with Dr Edwards.  No post surgical complications.  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 Tumor size 2.5 cm. Stage T2, Nx\par \par 5/2019 Left thyroid lobectomy: adenomatous hyperplasia, parathyroid tissue, no tumor\par \par 5/2019 Ca 9.7, iPTH 38\par 7/2019 Ca 9.4, iPTH 58\par \par 10/2019 neck sono: s/p total thyroidectomy, no adenopathy\par 3/2021: neck sono: s/p total thyroidectomy, no adenopathy\par 9/2021:neck sono: s/p total thyroidectomy, no adenopathy\par \par 7/2022 neck sono - s/p total thyroidectomy, no adenopathy\par \par \par  He has been on varying doses of  thyroid hormone since 2019,  For the past year he has been on LT4 200 mcg 1 tab Mon-Fri, 1.5 tab Sat-Sun. He takes his thyroid medication daily on empty stomach.\par \par No family thyroid cancer, sister with hashimoto's thyroid disease\par

## 2023-01-12 ENCOUNTER — NON-APPOINTMENT (OUTPATIENT)
Age: 28
End: 2023-01-12

## 2023-03-23 ENCOUNTER — APPOINTMENT (OUTPATIENT)
Dept: ENDOCRINOLOGY | Facility: CLINIC | Age: 28
End: 2023-03-23
Payer: COMMERCIAL

## 2023-03-23 VITALS
HEIGHT: 68 IN | WEIGHT: 228 LBS | SYSTOLIC BLOOD PRESSURE: 130 MMHG | HEART RATE: 81 BPM | DIASTOLIC BLOOD PRESSURE: 88 MMHG | OXYGEN SATURATION: 98 % | BODY MASS INDEX: 34.56 KG/M2

## 2023-03-23 PROCEDURE — 99214 OFFICE O/P EST MOD 30 MIN: CPT

## 2023-03-23 NOTE — HISTORY OF PRESENT ILLNESS
[FreeTextEntry1] : Interval hx - no changes, no issues\par \par In 2019 he was diagnosed with thyroid cancer. At first underwent hemithyroidectomy and then underwent completion thyroidectomy later that year at [facility] with Dr Edwards.  No post surgical complications.  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 Tumor size 2.5 cm. Stage T2, Nx\par \par 5/2019 Left thyroid lobectomy: adenomatous hyperplasia, parathyroid tissue, no tumor\par \par 5/2019 Ca 9.7, iPTH 38\par 7/2019 Ca 9.4, iPTH 58\par \par 10/2019 neck sono: s/p total thyroidectomy, no adenopathy\par 3/2021: neck sono: s/p total thyroidectomy, no adenopathy\par 9/2021:neck sono: s/p total thyroidectomy, no adenopathy\par 7/2022 neck sono - s/p total thyroidectomy, no adenopathy\par \par He has been on varying doses of  thyroid hormone since 2019,  For the past year he has been on LT4 200 mcg 1 tab daily He takes his thyroid medication daily on empty stomach.\par \par No family thyroid cancer, sister with hashimoto's thyroid disease\par

## 2023-03-23 NOTE — DATA REVIEWED
[FreeTextEntry1] : Labs 7/2822\par TSH 0.02, Tg 0.2, tg ab neg\par \par labs 3/18/23 TSH 0.03, tg ab neg

## 2023-03-23 NOTE — ASSESSMENT
[FreeTextEntry1] : 26 year old male  diagnosed with thyroid cancer in 2019. At first underwent hemithyroidectomy and then underwent completion thyroidectomy later that year at [facility] with Dr Edwards. Path: s/p total thyroidectomy, no adenopathy.  No Figueredo needed\par - VALERIANO guidelines reviewed - low risk of recurrence based on pathology, to date KIMANI on sonogram and recently with low Tg levels with neg Tg ab, excellent response to therapy, Goal TSH 0.5-2 and TSH still too low\par - decrease LT4 175 mcg daily\par - periodic neck sono yearly\par - cont to monitor unstim Tg levels\par \par \par \par \par \par

## 2023-03-23 NOTE — REVIEW OF SYSTEMS
[Recent Weight Loss (___ Lbs)] : recent weight loss: [unfilled] lbs [Heat Intolerance] : heat intolerance [Recent Weight Gain (___ Lbs)] : no recent weight gain [Dysphagia] : no dysphagia [Neck Pain] : no neck pain [Dysphonia] : no dysphonia [Palpitations] : no palpitations [Fast Heart Rate] : heart rate is not fast [Constipation] : no constipation [Diarrhea] : no diarrhea [Dizziness] : no dizziness [Tremors] : no tremors [Insomnia] : no insomnia [Anxiety] : no anxiety

## 2023-05-30 ENCOUNTER — LABORATORY RESULT (OUTPATIENT)
Age: 28
End: 2023-05-30

## 2023-05-30 ENCOUNTER — RX RENEWAL (OUTPATIENT)
Age: 28
End: 2023-05-30

## 2023-05-30 LAB
T4 FREE SERPL-MCNC: 1.8 NG/DL
TSH SERPL-ACNC: 0.28 UIU/ML

## 2023-05-31 LAB
THYROGLOB AB SERPL-ACNC: <20 IU/ML
THYROGLOB SERPL-MCNC: 0.58 NG/ML

## 2023-06-07 ENCOUNTER — APPOINTMENT (OUTPATIENT)
Dept: ENDOCRINOLOGY | Facility: CLINIC | Age: 28
End: 2023-06-07
Payer: COMMERCIAL

## 2023-06-07 VITALS
SYSTOLIC BLOOD PRESSURE: 118 MMHG | BODY MASS INDEX: 30.01 KG/M2 | WEIGHT: 198 LBS | HEIGHT: 68 IN | HEART RATE: 76 BPM | DIASTOLIC BLOOD PRESSURE: 68 MMHG

## 2023-06-07 PROCEDURE — 99214 OFFICE O/P EST MOD 30 MIN: CPT

## 2023-06-07 NOTE — HISTORY OF PRESENT ILLNESS
[FreeTextEntry1] : Interval hx - no changes, no issues\par \par In 2019 he was diagnosed with thyroid cancer. At first underwent hemithyroidectomy and then underwent completion thyroidectomy later that year at [facility] with Dr Edwards.  No post surgical complications.  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 Tumor size 2.5 cm. Stage T2, Nx\par \par 5/2019 Left thyroid lobectomy: adenomatous hyperplasia, parathyroid tissue, no tumor\par \par 5/2019 Ca 9.7, iPTH 38\par 7/2019 Ca 9.4, iPTH 58\par \par 10/2019 neck sono: s/p total thyroidectomy, no adenopathy\par 3/2021: neck sono: s/p total thyroidectomy, no adenopathy\par 9/2021:neck sono: s/p total thyroidectomy, no adenopathy\par 7/2022 neck sono - s/p total thyroidectomy, no adenopathy\par \par He has been on varying doses of  thyroid hormone since 2019,  For the past year he has been on LT4 200 mcg 1 tab daily and dose decreased to 175 mcg daily at last visit.  He takes his thyroid medication daily on empty stomach.\par \par No family thyroid cancer, sister with hashimoto's thyroid disease\par

## 2023-06-07 NOTE — PHYSICAL EXAM
[Alert] : alert [Obese] : obese [No LAD] : no lymphadenopathy [Well Healed Scar] : well healed scar [Clear to Auscultation] : lungs were clear to auscultation bilaterally [Normal S1, S2] : normal S1 and S2 [Normal Rate] : heart rate was normal [No Edema] : no peripheral edema [Normal Reflexes] : deep tendon reflexes were 2+ and symmetric [No Tremors] : no tremors [Oriented x3] : oriented to person, place, and time [Normal Affect] : the affect was normal [Normal Insight/Judgement] : insight and judgment were intact [Normal Mood] : the mood was normal [No Respiratory Distress] : no respiratory distress

## 2023-06-07 NOTE — ASSESSMENT
[FreeTextEntry1] : 27 year old male  diagnosed with thyroid cancer in 2019. At first underwent hemithyroidectomy and then underwent completion thyroidectomy later that year at [facility] with Dr Edwards. Path: s/p total thyroidectomy, no adenopathy.  No COLIN needed, Unstim Tg low\par - VALERIANO guidelines reviewed - low risk of recurrence based on pathology, to date KIMANI on sonogram and recently with low Tg levels with neg Tg ab, excellent response to therapy, Goal TSH 0.5-2, TSH 0.2 - acceptable\par - cont LT4 175 mcg daily\par - periodic neck sono \par - cont to monitor unstim Tg levels\par \par \par \par \par \par

## 2023-09-03 ENCOUNTER — EMERGENCY (EMERGENCY)
Facility: HOSPITAL | Age: 28
LOS: 1 days | Discharge: HOME | End: 2023-09-03
Attending: EMERGENCY MEDICINE
Payer: COMMERCIAL

## 2023-09-03 VITALS
RESPIRATION RATE: 20 BRPM | TEMPERATURE: 98 F | HEIGHT: 68 IN | WEIGHT: 188.94 LBS | HEART RATE: 120 BPM | DIASTOLIC BLOOD PRESSURE: 71 MMHG | OXYGEN SATURATION: 97 % | SYSTOLIC BLOOD PRESSURE: 116 MMHG

## 2023-09-03 VITALS
SYSTOLIC BLOOD PRESSURE: 120 MMHG | DIASTOLIC BLOOD PRESSURE: 74 MMHG | RESPIRATION RATE: 18 BRPM | HEART RATE: 79 BPM | TEMPERATURE: 98 F | OXYGEN SATURATION: 100 %

## 2023-09-03 DIAGNOSIS — Z98.890 OTHER SPECIFIED POSTPROCEDURAL STATES: Chronic | ICD-10-CM

## 2023-09-03 DIAGNOSIS — I48.91 UNSPECIFIED ATRIAL FIBRILLATION: ICD-10-CM

## 2023-09-03 DIAGNOSIS — R55 SYNCOPE AND COLLAPSE: ICD-10-CM

## 2023-09-03 LAB
A1C WITH ESTIMATED AVERAGE GLUCOSE RESULT: 5.3 % — SIGNIFICANT CHANGE UP (ref 4–5.6)
ALBUMIN SERPL ELPH-MCNC: 4.3 G/DL — SIGNIFICANT CHANGE UP (ref 3.3–5.2)
ALP SERPL-CCNC: 87 U/L — SIGNIFICANT CHANGE UP (ref 40–120)
ALT FLD-CCNC: 65 U/L — HIGH
ANION GAP SERPL CALC-SCNC: 14 MMOL/L — SIGNIFICANT CHANGE UP (ref 5–17)
APPEARANCE UR: CLEAR — SIGNIFICANT CHANGE UP
AST SERPL-CCNC: 69 U/L — HIGH
BASOPHILS # BLD AUTO: 0.07 K/UL — SIGNIFICANT CHANGE UP (ref 0–0.2)
BASOPHILS NFR BLD AUTO: 0.9 % — SIGNIFICANT CHANGE UP (ref 0–2)
BILIRUB SERPL-MCNC: 0.3 MG/DL — LOW (ref 0.4–2)
BILIRUB UR-MCNC: NEGATIVE — SIGNIFICANT CHANGE UP
BUN SERPL-MCNC: 21.1 MG/DL — HIGH (ref 8–20)
CALCIUM SERPL-MCNC: 9.3 MG/DL — SIGNIFICANT CHANGE UP (ref 8.4–10.5)
CHLORIDE SERPL-SCNC: 99 MMOL/L — SIGNIFICANT CHANGE UP (ref 96–108)
CHOLEST SERPL-MCNC: 171 MG/DL — SIGNIFICANT CHANGE UP
CK MB CFR SERPL CALC: 1.3 NG/ML — SIGNIFICANT CHANGE UP (ref 0–6.7)
CK SERPL-CCNC: 316 U/L — HIGH (ref 30–200)
CO2 SERPL-SCNC: 22 MMOL/L — SIGNIFICANT CHANGE UP (ref 22–29)
COLOR SPEC: YELLOW — SIGNIFICANT CHANGE UP
CREAT SERPL-MCNC: 1.22 MG/DL — SIGNIFICANT CHANGE UP (ref 0.5–1.3)
D DIMER BLD IA.RAPID-MCNC: <150 NG/ML DDU — SIGNIFICANT CHANGE UP
DIFF PNL FLD: NEGATIVE — SIGNIFICANT CHANGE UP
EGFR: 83 ML/MIN/1.73M2 — SIGNIFICANT CHANGE UP
EOSINOPHIL # BLD AUTO: 0.2 K/UL — SIGNIFICANT CHANGE UP (ref 0–0.5)
EOSINOPHIL NFR BLD AUTO: 2.5 % — SIGNIFICANT CHANGE UP (ref 0–6)
ESTIMATED AVERAGE GLUCOSE: 105 MG/DL — SIGNIFICANT CHANGE UP (ref 68–114)
GLUCOSE SERPL-MCNC: 154 MG/DL — HIGH (ref 70–99)
GLUCOSE UR QL: NEGATIVE MG/DL — SIGNIFICANT CHANGE UP
HCT VFR BLD CALC: 40.4 % — SIGNIFICANT CHANGE UP (ref 39–50)
HDLC SERPL-MCNC: 54 MG/DL — SIGNIFICANT CHANGE UP
HGB BLD-MCNC: 14.2 G/DL — SIGNIFICANT CHANGE UP (ref 13–17)
IMM GRANULOCYTES NFR BLD AUTO: 0.1 % — SIGNIFICANT CHANGE UP (ref 0–0.9)
KETONES UR-MCNC: ABNORMAL
LEUKOCYTE ESTERASE UR-ACNC: NEGATIVE — SIGNIFICANT CHANGE UP
LIPID PNL WITH DIRECT LDL SERPL: 105 MG/DL — HIGH
LYMPHOCYTES # BLD AUTO: 3.98 K/UL — HIGH (ref 1–3.3)
LYMPHOCYTES # BLD AUTO: 50.1 % — HIGH (ref 13–44)
MAGNESIUM SERPL-MCNC: 1.9 MG/DL — SIGNIFICANT CHANGE UP (ref 1.6–2.6)
MCHC RBC-ENTMCNC: 32.1 PG — SIGNIFICANT CHANGE UP (ref 27–34)
MCHC RBC-ENTMCNC: 35.1 GM/DL — SIGNIFICANT CHANGE UP (ref 32–36)
MCV RBC AUTO: 91.2 FL — SIGNIFICANT CHANGE UP (ref 80–100)
MONOCYTES # BLD AUTO: 0.6 K/UL — SIGNIFICANT CHANGE UP (ref 0–0.9)
MONOCYTES NFR BLD AUTO: 7.5 % — SIGNIFICANT CHANGE UP (ref 2–14)
NEUTROPHILS # BLD AUTO: 3.09 K/UL — SIGNIFICANT CHANGE UP (ref 1.8–7.4)
NEUTROPHILS NFR BLD AUTO: 38.9 % — LOW (ref 43–77)
NITRITE UR-MCNC: NEGATIVE — SIGNIFICANT CHANGE UP
NON HDL CHOLESTEROL: 117 MG/DL — SIGNIFICANT CHANGE UP
NT-PROBNP SERPL-SCNC: <5 PG/ML — SIGNIFICANT CHANGE UP (ref 0–300)
PH UR: 6 — SIGNIFICANT CHANGE UP (ref 5–8)
PLATELET # BLD AUTO: 188 K/UL — SIGNIFICANT CHANGE UP (ref 150–400)
POTASSIUM SERPL-MCNC: 4.6 MMOL/L — SIGNIFICANT CHANGE UP (ref 3.5–5.3)
POTASSIUM SERPL-SCNC: 4.6 MMOL/L — SIGNIFICANT CHANGE UP (ref 3.5–5.3)
PROT SERPL-MCNC: 7 G/DL — SIGNIFICANT CHANGE UP (ref 6.6–8.7)
PROT UR-MCNC: NEGATIVE — SIGNIFICANT CHANGE UP
RBC # BLD: 4.43 M/UL — SIGNIFICANT CHANGE UP (ref 4.2–5.8)
RBC # FLD: 12.3 % — SIGNIFICANT CHANGE UP (ref 10.3–14.5)
SODIUM SERPL-SCNC: 135 MMOL/L — SIGNIFICANT CHANGE UP (ref 135–145)
SP GR SPEC: 1.02 — SIGNIFICANT CHANGE UP (ref 1.01–1.02)
TRIGL SERPL-MCNC: 60 MG/DL — SIGNIFICANT CHANGE UP
TROPONIN T SERPL-MCNC: <0.01 NG/ML — SIGNIFICANT CHANGE UP (ref 0–0.06)
TSH SERPL-MCNC: 0.78 UIU/ML — SIGNIFICANT CHANGE UP (ref 0.27–4.2)
UROBILINOGEN FLD QL: NEGATIVE MG/DL — SIGNIFICANT CHANGE UP
WBC # BLD: 7.95 K/UL — SIGNIFICANT CHANGE UP (ref 3.8–10.5)
WBC # FLD AUTO: 7.95 K/UL — SIGNIFICANT CHANGE UP (ref 3.8–10.5)

## 2023-09-03 PROCEDURE — 80053 COMPREHEN METABOLIC PANEL: CPT

## 2023-09-03 PROCEDURE — G1004: CPT

## 2023-09-03 PROCEDURE — 82550 ASSAY OF CK (CPK): CPT

## 2023-09-03 PROCEDURE — 80061 LIPID PANEL: CPT

## 2023-09-03 PROCEDURE — 70450 CT HEAD/BRAIN W/O DYE: CPT | Mod: MG

## 2023-09-03 PROCEDURE — 93306 TTE W/DOPPLER COMPLETE: CPT

## 2023-09-03 PROCEDURE — 70450 CT HEAD/BRAIN W/O DYE: CPT | Mod: 26,MG

## 2023-09-03 PROCEDURE — 99223 1ST HOSP IP/OBS HIGH 75: CPT | Mod: 25

## 2023-09-03 PROCEDURE — 83036 HEMOGLOBIN GLYCOSYLATED A1C: CPT

## 2023-09-03 PROCEDURE — 83735 ASSAY OF MAGNESIUM: CPT

## 2023-09-03 PROCEDURE — 96374 THER/PROPH/DIAG INJ IV PUSH: CPT | Mod: XU

## 2023-09-03 PROCEDURE — 84480 ASSAY TRIIODOTHYRONINE (T3): CPT

## 2023-09-03 PROCEDURE — 85379 FIBRIN DEGRADATION QUANT: CPT

## 2023-09-03 PROCEDURE — 84436 ASSAY OF TOTAL THYROXINE: CPT

## 2023-09-03 PROCEDURE — 93306 TTE W/DOPPLER COMPLETE: CPT | Mod: 26

## 2023-09-03 PROCEDURE — 93005 ELECTROCARDIOGRAM TRACING: CPT

## 2023-09-03 PROCEDURE — 71045 X-RAY EXAM CHEST 1 VIEW: CPT | Mod: 26

## 2023-09-03 PROCEDURE — 84443 ASSAY THYROID STIM HORMONE: CPT

## 2023-09-03 PROCEDURE — 83880 ASSAY OF NATRIURETIC PEPTIDE: CPT

## 2023-09-03 PROCEDURE — 99291 CRITICAL CARE FIRST HOUR: CPT | Mod: 25

## 2023-09-03 PROCEDURE — 71045 X-RAY EXAM CHEST 1 VIEW: CPT

## 2023-09-03 PROCEDURE — 99284 EMERGENCY DEPT VISIT MOD MDM: CPT

## 2023-09-03 PROCEDURE — 84484 ASSAY OF TROPONIN QUANT: CPT

## 2023-09-03 PROCEDURE — 93010 ELECTROCARDIOGRAM REPORT: CPT

## 2023-09-03 PROCEDURE — G0378: CPT

## 2023-09-03 PROCEDURE — 82553 CREATINE MB FRACTION: CPT

## 2023-09-03 PROCEDURE — 81003 URINALYSIS AUTO W/O SCOPE: CPT

## 2023-09-03 PROCEDURE — 85025 COMPLETE CBC W/AUTO DIFF WBC: CPT

## 2023-09-03 PROCEDURE — 36415 COLL VENOUS BLD VENIPUNCTURE: CPT

## 2023-09-03 RX ORDER — LEVOTHYROXINE SODIUM 125 MCG
150 TABLET ORAL DAILY
Refills: 0 | Status: DISCONTINUED | OUTPATIENT
Start: 2023-09-03 | End: 2023-09-10

## 2023-09-03 RX ORDER — OXYCODONE HYDROCHLORIDE 5 MG/1
5 TABLET ORAL EVERY 6 HOURS
Refills: 0 | Status: DISCONTINUED | OUTPATIENT
Start: 2023-09-03 | End: 2023-09-03

## 2023-09-03 RX ORDER — METOPROLOL TARTRATE 50 MG
5 TABLET ORAL EVERY 6 HOURS
Refills: 0 | Status: DISCONTINUED | OUTPATIENT
Start: 2023-09-03 | End: 2023-09-10

## 2023-09-03 RX ORDER — SODIUM CHLORIDE 9 MG/ML
1000 INJECTION, SOLUTION INTRAVENOUS ONCE
Refills: 0 | Status: COMPLETED | OUTPATIENT
Start: 2023-09-03 | End: 2023-09-03

## 2023-09-03 RX ORDER — DILTIAZEM HCL 120 MG
30 CAPSULE, EXT RELEASE 24 HR ORAL EVERY 6 HOURS
Refills: 0 | Status: DISCONTINUED | OUTPATIENT
Start: 2023-09-03 | End: 2023-09-10

## 2023-09-03 RX ORDER — DILTIAZEM HCL 120 MG
20 CAPSULE, EXT RELEASE 24 HR ORAL ONCE
Refills: 0 | Status: DISCONTINUED | OUTPATIENT
Start: 2023-09-03 | End: 2023-09-03

## 2023-09-03 RX ORDER — OXYCODONE HYDROCHLORIDE 5 MG/1
10 TABLET ORAL EVERY 6 HOURS
Refills: 0 | Status: DISCONTINUED | OUTPATIENT
Start: 2023-09-03 | End: 2023-09-03

## 2023-09-03 RX ORDER — METOPROLOL TARTRATE 50 MG
5 TABLET ORAL ONCE
Refills: 0 | Status: COMPLETED | OUTPATIENT
Start: 2023-09-03 | End: 2023-09-03

## 2023-09-03 RX ADMIN — Medication 30 MILLIGRAM(S): at 06:57

## 2023-09-03 RX ADMIN — SODIUM CHLORIDE 1000 MILLILITER(S): 9 INJECTION, SOLUTION INTRAVENOUS at 03:11

## 2023-09-03 RX ADMIN — Medication 30 MILLIGRAM(S): at 11:23

## 2023-09-03 RX ADMIN — SODIUM CHLORIDE 1000 MILLILITER(S): 9 INJECTION, SOLUTION INTRAVENOUS at 05:29

## 2023-09-03 RX ADMIN — Medication 5 MILLIGRAM(S): at 03:57

## 2023-09-03 RX ADMIN — Medication 150 MICROGRAM(S): at 07:01

## 2023-09-03 NOTE — ED CDU PROVIDER DISPOSITION NOTE - NS ED ATTENDING STATEMENT MOD
This was a shared visit with the KARISSA. I reviewed and verified the documentation and independently performed the documented: Attending with

## 2023-09-03 NOTE — ED CDU PROVIDER DISPOSITION NOTE - PATIENT PORTAL LINK FT
You can access the FollowMyHealth Patient Portal offered by [facility] by registering at the following website: [email or web address]. By joining [employer]’s FollowMyHealth portal, you will also be able to view your health information using other applications (apps) compatible with our system.

## 2023-09-03 NOTE — ED CDU PROVIDER DISPOSITION NOTE - CLINICAL COURSE
27yo M w/ PMHx of thyroid nodule s/p resection on daily Synthroid presents to [facility] after a near syncopal event.  Patient was leaving a family barbecue around 2AM, when he started having blurry vision and feeling like he was "going to pass out."  Started to walk, became unsteady and fell into a clock on the wall, eventually landing on the ground.  Event witnessed by patient's brother who helped him to sit up right afterwards.  EMS called and on arrival to [facility] patient c/o "feeling cold and thirsty."  On Tele monitor new onset arrhythmia A-fib w/ RVR detected.  pt admit has some cranberry juice and vodka . he was feeling dizzy but he did not passed out - now feeling better - patient had done echo result without significant finding- patient seen by cardiologist and cleared  by cardiology team- he knows to follow-up with cardiology .  as per cardiologist does not need to be discharged on any medication just drink a lot of fluids

## 2023-09-03 NOTE — ED ADULT TRIAGE NOTE - CHIEF COMPLAINT QUOTE
c/o witnessed syncopal episode.  -hit head.  AS per EMS possible new onset afib.  HR between 108-130s.  Denies SOB, CP.  MD called to bedside, pt brought to critical care.

## 2023-09-03 NOTE — ED PROVIDER NOTE - OBJECTIVE STATEMENT
CONSUELO HURD is a 27yo Male with PMH Thyroid nodule s/p resection on replacement hormone who presents after syncope vs near syncope. Pt states around 2am he was leaving a family barbecue when he started having blurry vision and feeling like he was going to pass out. He started to fall and his brother caught him and helped him to sit down on a cough. ON arrival to the ED he is c/o feeling cold and thirsty. Denies chest pain. Denies palpitations.

## 2023-09-03 NOTE — ED CDU PROVIDER INITIAL DAY NOTE - ATTENDING APP SHARED VISIT CONTRIBUTION OF CARE
pt in OBS on AFIB protocol  rate now controlled  + etoh prior to onset of symptoms  plan is for echo today  [facility] cardio consulted  agree w care plan

## 2023-09-03 NOTE — ED PROVIDER NOTE - NS ED ROS FT
Review of Systems  SKIN: warm, dry w/ no rash or bleeding  RESPIRATORY: no cough or SOB  CARDIAC: no chest pain & no palpitations +NEAR SYNCOPE  GI: no abd pain, nausea, vomiting, diarrhea  MUSCULOSKELETAL:  no joint pain, swelling or redness  NEURO: Alert, oriented x3. No weakness, numbness.

## 2023-09-03 NOTE — ED CDU PROVIDER DISPOSITION NOTE - ATTENDING CONTRIBUTION TO CARE
Pt cleared by cardiology for discharge  No meds required  Pt to avoid alcohol and stay well hydrated with water  agree w dispo and outpt follow up including [facility]

## 2023-09-03 NOTE — ED ADULT NURSE NOTE - NSICDXFAMILYHX_GEN_ALL_CORE_FT
FAMILY HISTORY:  Family history of hypertension  FH: diabetes mellitus, maternal and paternal grandparents  FH: prostate cancer, gradfather

## 2023-09-03 NOTE — ED ADULT NURSE NOTE - OBJECTIVE STATEMENT
pt is a 28y male AOX4, acc'd by mother.  pt BIBEMS after witnessed syncopal episode.  per family, he was caught and did not hit his head.  per EMS, pt is in afib on tele monitor with HR in 110's-130's.  pt has no cardiac hx or hx of afib.  at this time, pt c/o dizziness, feeling cold and thirsty.  denies SOB, nvd, abd pain, headache, sweats, chest pain, palpitations.  reports that he had alcohol today at a party and did not drink much water today.

## 2023-09-03 NOTE — ED ADULT NURSE REASSESSMENT NOTE - NS ED NURSE REASSESS COMMENT FT1
assumed care of pt from previous RN. Pt a&ox4, mom remains at bedside. Pt on CM, noted to be in NSR on monitor and broke out of afib. Pt denies any complaints at this time, CT completed awaiting results. pt awaiting echo. EKG obtained at change of shift. B/L IV intact, flushing without difficulty, no s/s of infiltration noted. bed locked in lowest position, safety maintained.

## 2023-09-03 NOTE — ED PROVIDER NOTE - PHYSICAL EXAMINATION
Gen: Well appearing in NAD  Head: NC/AT  Neck: trachea midline  Resp:  No distress  CARD; TACHY AND IRREGULAR  Abd: soft, nd, nt  Ext: no deformities  Neuro:  A&O appears non focal  Skin:  Warm and dry as visualized  Psych:  Normal affect and mood

## 2023-09-03 NOTE — ED PROVIDER NOTE - ATTENDING CONTRIBUTION TO CARE
28-year-old male; with PMH significant for thyroid cancer (s/p thyroid resection); now presenting with syncopal episode.  Patient stated he was at a family barbecue had 3 mixed drinks of vodka with coke and also had 1 marijuana gummy.  Patient states when he was getting ready to leave the barbecue he felt lightheaded and syncopized into parents arms.  Denies any chest pain, palpitations, shortness of breath prior to syncope.  Denies cough.  Denies travel.  Denies other smoking.  Denies recent surgery.  Patient is on Synthroid 175 mcg daily, with dosing which has not changed in the past year.  General:     NAD  Head:     NC/AT, EOMI, oral mucosa moist  Neck:     trachea midline  Lungs:     CTA b/l, no w/r/r  CVS:     tachycardic, irregular, no m/g/r  Abd:     +BS, s/nt/nd, no organomegaly  Ext:    2+ radial and pedal pulses, no c/c/e  Neuro: AAOx3, no sensory/motor deficits  A/P:  28yoM p/w AFIB w/RVR  -labs, ivf, tsh, rate control, cardiology consult    Upon my evaluation, this patient had a high probability of imminent or life-threatening deterioration due to AFIB W/RVR , which required my direct attention, intervention, and personal management.    I have personally provided _40_ minutes of critical care time exclusive of time spent on separately billable procedures.  Time includes review of laboratory data, radiology results, discussion with consultants, and monitoring for potential decompensation.  Interventions were performed as documented.

## 2023-09-03 NOTE — ED CDU PROVIDER INITIAL DAY NOTE - CLINICAL SUMMARY MEDICAL DECISION MAKING FREE TEXT BOX
27yo M w/ PMHx of thyroid nodule s/p resection on daily Synthroid presents to [facility] after a near syncopal event.  Patient was leaving a family barbecue around 2AM, when he started having blurry vision and feeling like he was "going to pass out.- Find out Afib With RVR     - hypothyroidism on levothyroxine  - pending 3 rd trop   -telemetry cont   - metoprolol 5MG Q6rs PRN HR > 140 as per cardiology   -cardizem 30 Mg Q 6 Hours   -pt dose not need Anticoagulation medication  [id number] is zero  -ECHO  pending

## 2023-09-03 NOTE — CONSULT NOTE ADULT - NS ATTEND AMEND GEN_ALL_CORE FT
Patient was seen and examined at bedside with no active palpitations dizziness or lightheadedness; was at a bbq and notes he was dehydrated and felt palpitations dizziness and lightheadedness and pass out found to be in AF with RVR  Was in AFib in the ER and self converted to NSR   TSH normal but T3 66 - possibly consistent with subclinical hyperthyroidism   Trops negative x 2     27yo M w/ PMHx of thyroid nodule s/p resection on daily Synthroid presents to [facility] EDU after a syncopal event.  Patient was leaving a family barbecue around 2AM, when he started having blurry vision and feeling like he was "going to pass out."   - currently in sinus rhythm and rate controlled   - TTE done shows normal LVEF with no evidence of significant valvulopathy   - continue with synthroid 150 mcg po q daily   - will recommend to be discharged home with follow up with us in the office for [facility] and refer to [facility] R[facility] D[facility]  Cardiology/Vascular Cardiology -[facility] Cardiology   Telephone # [phone]

## 2023-09-03 NOTE — ED CDU PROVIDER INITIAL DAY NOTE - PHYSICAL EXAMINATION
Const: AOX3 nontoxic appearing, no apparent respiratory or physical distress.  on cardiac monitoring   HEENT: NC/AT. Moist mucous membranes.  Eyes: LEYDA. EOMI  Neck: Soft and supple. Full ROM without pain.  Cardiac: Regular rate and regular rhythm. +S1/S2. No LE edema.  Resp: Speaking in full sentences. No evidence of respiratory distress.   Abd: Soft, non-tender, non-distended.   Skin: No rashes, abrasions or lacerations.  Neuro: Awake, alert & oriented x 3. Moves all extremities symmetrically.

## 2023-09-03 NOTE — ED CDU PROVIDER DISPOSITION NOTE - NSFOLLOWUPINSTRUCTIONS_ED_ALL_ED_FT
Avoid drinking alcohol-   try to drink a lot of fluids  : Follow-up with your primary care doctor 2-3 day    days: Follow-up with your cardiology for the home monitoring   come back to the emergency room if any worsening of symptoms- palpitations- fluttering the chest headache or dizziness ,  or if you are feeling that you are going to pass out   read the instruction below  Atrial Fibrillation       Atrial fibrillation is a type of heartbeat that is irregular or fast. If you have this condition, your heart beats without any order. This makes it hard for your heart to pump blood in a normal way.    Atrial fibrillation may come and go, or it may become a long-lasting problem. If this condition is not treated, it can put you at higher risk for stroke, heart failure, and other heart problems.    What are the causes?  This condition may be caused by diseases that damage the heart. They include:    High blood pressure.  Heart failure.  Heart valve disease.  Heart surgery.    Other causes include:    Diabetes.  Thyroid disease.  Being overweight.  Kidney disease.    Sometimes the cause is not known.    What increases the risk?  You are more likely to develop this condition if:    You are older.  You smoke.  You exercise often and very hard.  You have a family history of this condition.  You are a man.  You use drugs.  You drink a lot of alcohol.  You have lung conditions, such as emphysema, pneumonia, or COPD.  You have sleep apnea.    What are the signs or symptoms?  Common symptoms of this condition include:    A feeling that your heart is beating very fast.  Chest pain or discomfort.  Feeling short of breath.  Suddenly feeling light-headed or weak.  Getting tired easily during activity.  Fainting.  Sweating.    In some cases, there are no symptoms.    How is this treated?  Treatment for this condition depends on underlying conditions and how you feel when you have atrial fibrillation. They include:    Medicines to:    Prevent blood clots.  Treat heart rate or heart rhythm problems.  Using devices, such as a pacemaker, to correct heart rhythm problems.  Doing surgery to remove the part of the heart that sends bad signals.  Closing an area where clots can form in the heart (left atrial appendage).    In some cases, your doctor will treat other underlying conditions.    Follow these instructions at home:      Medicines    Take over-the-counter and prescription medicines only as told by your doctor.  Do not take any new medicines without first talking to your doctor.  If you are taking blood thinners:     Talk with your doctor before you take any medicines that have aspirin or NSAIDs, such as ibuprofen, in them.  Take your medicine exactly as told by your doctor. Take it at the same time each day.   Avoid activities that could hurt or bruise you. Follow instructions about how to prevent falls.   Wear a bracelet that says you are taking blood thinners. Or, carry a card that lists what medicines you take.        Lifestyle      Do not use any products that have nicotine or tobacco in them. These include cigarettes, e-cigarettes, and chewing tobacco. If you need help quitting, ask your doctor.  Eat heart-healthy foods. Talk with your doctor about the right eating plan for you.  Exercise regularly as told by your doctor.  Do not drink alcohol.  Lose weight if you are overweight.  Do not use drugs, including cannabis.        General instructions    If you have a condition that causes breathing to stop for a short period of time (apnea), treat it as told by your doctor.  Keep a healthy weight. Do not use diet pills unless your doctor says they are safe for you. Diet pills may make heart problems worse.  Keep all follow-up visits as told by your doctor. This is important.    Contact a doctor if:  You notice a change in the speed, rhythm, or strength of your heartbeat.  You are taking a blood-thinning medicine and you get more bruising.  You get tired more easily when you move or exercise.  You have a sudden change in weight.    Get help right away if:     You have pain in your chest or your belly (abdomen).  You have trouble breathing.  You have side effects of blood thinners, such as blood in your vomit, poop (stool), or pee (urine), or bleeding that cannot stop.  You have any signs of a stroke. "BE FAST" is an easy way to remember the main warning signs:    B - Balance. Signs are dizziness, sudden trouble walking, or loss of balance.  E - Eyes. Signs are trouble seeing or a change in how you see.  F - Face. Signs are sudden weakness or loss of feeling in the face, or the face or eyelid drooping on one side.  A - Arms. Signs are weakness or loss of feeling in an arm. This happens suddenly and usually on one side of the body.  S - Speech. Signs are sudden trouble speaking, slurred speech, or trouble understanding what people say.  T - Time. Time to call emergency services. Write down what time symptoms started.  You have other signs of a stroke, such as:    A sudden, very bad headache with no known cause.   Feeling like you may vomit (nausea).  Vomiting.  A seizure.    These symptoms may be an emergency. Do not wait to see if the symptoms will go away. Get medical help right away. Call your local emergency services (911 in the [place] Do not drive yourself to the hospital.    Summary  Atrial fibrillation is a type of heartbeat that is irregular or fast.  You are at higher risk of this condition if you smoke, are older, have diabetes, or are overweight.  Follow your doctor's instructions about medicines, diet, exercise, and follow-up visits.  Get help right away if you have signs or symptoms of a stroke.  Get help right away if you cannot catch your breath, or you have chest pain or discomfort.    ADDITIONAL NOTES AND INSTRUCTIONS    Please follow up with your Primary MD in 24-48 hr.  Seek immediate medical care for any new/worsening signs or symptoms. Avoid drinking alcohol-   try to drink a lot of fluids(water)  : Follow-up with your primary care doctor 2-3 day    days: Follow-up with your cardiology for the home monitoring   come back to the emergency room if any worsening of symptoms- palpitations- fluttering the chest headache or dizziness ,  or if you are feeling that you are going to pass out   read the instruction below  Atrial Fibrillation       Atrial fibrillation is a type of heartbeat that is irregular or fast. If you have this condition, your heart beats without any order. This makes it hard for your heart to pump blood in a normal way.    Atrial fibrillation may come and go, or it may become a long-lasting problem. If this condition is not treated, it can put you at higher risk for stroke, heart failure, and other heart problems.    What are the causes?  This condition may be caused by diseases that damage the heart. They include:    High blood pressure.  Heart failure.  Heart valve disease.  Heart surgery.    Other causes include:    Diabetes.  Thyroid disease.  Being overweight.  Kidney disease.    Sometimes the cause is not known.    What increases the risk?  You are more likely to develop this condition if:    You are older.  You smoke.  You exercise often and very hard.  You have a family history of this condition.  You are a man.  You use drugs.  You drink a lot of alcohol.  You have lung conditions, such as emphysema, pneumonia, or COPD.  You have sleep apnea.    What are the signs or symptoms?  Common symptoms of this condition include:    A feeling that your heart is beating very fast.  Chest pain or discomfort.  Feeling short of breath.  Suddenly feeling light-headed or weak.  Getting tired easily during activity.  Fainting.  Sweating.    In some cases, there are no symptoms.    How is this treated?  Treatment for this condition depends on underlying conditions and how you feel when you have atrial fibrillation. They include:    Medicines to:    Prevent blood clots.  Treat heart rate or heart rhythm problems.  Using devices, such as a pacemaker, to correct heart rhythm problems.  Doing surgery to remove the part of the heart that sends bad signals.  Closing an area where clots can form in the heart (left atrial appendage).    In some cases, your doctor will treat other underlying conditions.    Follow these instructions at home:      Medicines    Take over-the-counter and prescription medicines only as told by your doctor.  Do not take any new medicines without first talking to your doctor.  If you are taking blood thinners:     Talk with your doctor before you take any medicines that have aspirin or NSAIDs, such as ibuprofen, in them.  Take your medicine exactly as told by your doctor. Take it at the same time each day.   Avoid activities that could hurt or bruise you. Follow instructions about how to prevent falls.   Wear a bracelet that says you are taking blood thinners. Or, carry a card that lists what medicines you take.        Lifestyle      Do not use any products that have nicotine or tobacco in them. These include cigarettes, e-cigarettes, and chewing tobacco. If you need help quitting, ask your doctor.  Eat heart-healthy foods. Talk with your doctor about the right eating plan for you.  Exercise regularly as told by your doctor.  Do not drink alcohol.  Lose weight if you are overweight.  Do not use drugs, including cannabis.        General instructions    If you have a condition that causes breathing to stop for a short period of time (apnea), treat it as told by your doctor.  Keep a healthy weight. Do not use diet pills unless your doctor says they are safe for you. Diet pills may make heart problems worse.  Keep all follow-up visits as told by your doctor. This is important.    Contact a doctor if:  You notice a change in the speed, rhythm, or strength of your heartbeat.  You are taking a blood-thinning medicine and you get more bruising.  You get tired more easily when you move or exercise.  You have a sudden change in weight.    Get help right away if:     You have pain in your chest or your belly (abdomen).  You have trouble breathing.  You have side effects of blood thinners, such as blood in your vomit, poop (stool), or pee (urine), or bleeding that cannot stop.  You have any signs of a stroke. "BE FAST" is an easy way to remember the main warning signs:    B - Balance. Signs are dizziness, sudden trouble walking, or loss of balance.  E - Eyes. Signs are trouble seeing or a change in how you see.  F - Face. Signs are sudden weakness or loss of feeling in the face, or the face or eyelid drooping on one side.  A - Arms. Signs are weakness or loss of feeling in an arm. This happens suddenly and usually on one side of the body.  S - Speech. Signs are sudden trouble speaking, slurred speech, or trouble understanding what people say.  T - Time. Time to call emergency services. Write down what time symptoms started.  You have other signs of a stroke, such as:    A sudden, very bad headache with no known cause.   Feeling like you may vomit (nausea).  Vomiting.  A seizure.    These symptoms may be an emergency. Do not wait to see if the symptoms will go away. Get medical help right away. Call your local emergency services (911 in the [place] Do not drive yourself to the hospital.    Summary  Atrial fibrillation is a type of heartbeat that is irregular or fast.  You are at higher risk of this condition if you smoke, are older, have diabetes, or are overweight.  Follow your doctor's instructions about medicines, diet, exercise, and follow-up visits.  Get help right away if you have signs or symptoms of a stroke.  Get help right away if you cannot catch your breath, or you have chest pain or discomfort.    ADDITIONAL NOTES AND INSTRUCTIONS    Please follow up with your Primary MD in 24-48 hr.  Seek immediate medical care for any new/worsening signs or symptoms.

## 2023-09-03 NOTE — CONSULT NOTE ADULT - PROBLEM SELECTOR RECOMMENDATION 9
Atrial fib, patient asymptomatic with rapid ventricular response  - [id number]= 0  - check TFTs, BMP, trend CEx3, CBC, d-dimer and pro-BNP  - low dose Metoprolol 5mg IVP every 6 hours/PRN for HR >130, on Tele monitoring  - can attempt Cardizem 10mg IV dose if refractory to BB therapy  - IVF  - awaiting TTE in AM Atrial fib, patient asymptomatic with rapid ventricular response  - [id number]= 0  - check TFTs, BMP, trend CEx3, CBC, d-dimer and pro-BNP  - asymptomatic patient with Afib a mean rate-control goal of <110 beats/min may be reasonable  - low dose Metoprolol 5mg IVP every 6 hours/PRN for HR >130, on Tele monitoring  - can attempt Cardizem 10mg IV dose if refractory to BB therapy and Cardizem 30mg oral every 4 hours afterwards  - IVF  - awaiting TTE in AM to assess functional/structural status

## 2023-09-03 NOTE — ED PROVIDER NOTE - CLINICAL SUMMARY MEDICAL DECISION MAKING FREE TEXT BOX
ASSESSMENT:   CONSUELO HURD is a 29yo M who presented with near syncope in the setting of new afib. Tachy and irregular otherwise vitals stable. Physical exam non contributory. EKG w/ afib w/ rvr.     PLAN: Control Rate. Hydration. Check cardiac enzymes. Screen for infection, electrolyte disturbances. CXR to evaluate for other chest pathology.

## 2023-09-03 NOTE — CONSULT NOTE ADULT - PROBLEM SELECTOR RECOMMENDATION 2
Monitor on tele to r/o arrhythmic causes, check ECG, CXR  - possible etiologies include vagal tone mediated, arrhythmia, orthostatic hypotension, autonomic, accidental falls, seizures, sleep disturbances, medications, intoxicants, metabolic disorders, and some psychiatric conditions  - situational causes include - micturition & defecation and coughing or sneezing  - schedule for Head CT to r/o acute neuro pathology  - TTE to evaluate for structural, valvular disease, myxoma, amyloid  - check orthostatics to r/o cause for near or syncope (after standing)  - might need Holter, event recorder, external vs. implantable loop recorder    case d/w Dr. Ritchie

## 2023-09-03 NOTE — ED CDU PROVIDER INITIAL DAY NOTE - NS ED ATTENDING STATEMENT MOD
This was a shared visit with the KARISSA. I reviewed and verified the documentation and independently performed the documented:

## 2023-09-03 NOTE — ED PROVIDER NOTE - NSTIMEPROVIDERCAREINITIATE_GEN_ER
03-Sep-2023 02:49 O-Z Flap Text: The defect edges were debeveled with a #15 scalpel blade.  Given the location of the defect, shape of the defect and the proximity to free margins an O-Z flap was deemed most appropriate.  Using a sterile surgical marker, an appropriate transposition flap was drawn incorporating the defect and placing the expected incisions within the relaxed skin tension lines where possible. The area thus outlined was incised deep to adipose tissue with a #15 scalpel blade.  The skin margins were undermined to an appropriate distance in all directions utilizing iris scissors.

## 2023-09-03 NOTE — CONSULT NOTE ADULT - SUBJECTIVE AND OBJECTIVE BOX
[facility]                                              CARDIOLOGY AT [facility]                                                   [address], [place] [address]                                             Telephone: [phone]. Fax:[phone]                                                       CARDIOLOGY CONSULTATION NOTE                                                                                             History obtained by: Patient and medical record  Community Cardiologist:  None   obtained: Yes [  ] No [  ]  Reason for Consultation: Near syncope  Available out pt records reviewed: Yes [x] No [  ]    Chief complaint:  Near syncope    HPI: Patient is a 29yo M w/ PMHx of Thyroid nodule s/p resection on Synthroid presents to [facility] after a near syncopal event.  Patient was leaving a family barbecue around 2AM, when he started having blurry vision and feeling like he was "going to pass out."  Started to fall to the ground and his brother caught him and helped him to sit down on a sofa nearby.  EMS called and on arrival to [facility] patient ss c/o "feeling cold and thirsty."  On Tele monitor new onset arrhythmia A-fib w/ RVR.  HR between 108-130s.  Patient otherwise denies chest pain, dyspnea, palpitations, diaphoresis, HA, slurred speech, back or abdominal pain.      CARDIAC TESTING   ECHO:  STRESS:  CATH:   ELECTROPHYSIOLOGY:     PAST MEDICAL HISTORY  Thyroid nodule    PAST SURGICAL HISTORY  H/O umbilical hernia repair  S/P thyroid biopsy    SOCIAL HISTORY:  Denies smoking/alcohol/drugs    FAMILY HISTORY: hypertension, diabetes mellitus (Maternal and paternal grandparents)  prostate cancer gradfather    Family History of Cardiovascular Disease:  Yes [  ] No [  ]  Coronary Artery Disease in first degree relative: Yes [  ] No [  ]  Sudden Cardiac Death in First degree relative: Yes [  ] No [  ]    HOME MEDICATIONS:  Synthroid    CURRENT OTHER MEDICATIONS:  lactated ringers Bolus 1000 milliLiter(s) IV Bolus once, Stop order after: 1 Doses    ALLERGIES: penicillins (Rash)    REVIEW OF SYMPTOMS:   CONSTITUTIONAL: No fever, + chills, no weight loss, + thirsty, no fatigue   ENMT: No sinus or throat pain  NECK: No pain or stiffness  CARDIOVASCULAR: no chest pain, no dyspnea, + syncope/presyncope, no palpitations, no dizziness, no Orthopnea, no Paroxsymal nocturnal dyspnea  RESPIRATORY: no Shortness of breath, no cough, no wheezing  : No dysuria, no hematuria   GI: No dark color stool, no nausea, no diarrhea, no constipation, no abdominal pain   NEURO: No headache, no slurred speech   MUSCULOSKELETAL: No joint pain or swelling; No muscle, back, or extremity pain  PSYCH: No agitation, no anxiety    ALL OTHER REVIEW OF SYSTEMS ARE NEGATIVE.    VITAL SIGNS:  T(C): 36.7 (23 @ 02:52), Max: 36.7 (23 @ 02:52)  T(F): 98 (23 @ 02:52), Max: 98 (23 @ 02:52)  HR: 98 (23 @ 04:15) (96 - 120)  BP: 129/64 (23 @ 04:15) (116/71 - 129/64)  RR: 16 (23 @ 04:15) (16 - 20)  SpO2: 100% (23 @ 04:15) (97% - 100%)    INTAKE AND OUTPUT:     PHYSICAL EXAM:  Constitutional: Comfortable, no acute distress   HEENT: Atraumatic, neck is supple, no JVD  CNS: A&Ox3. No focal deficits   Respiratory: CTAB, unlabored   Cardiovascular: + tachycardic, Irreg/irreg, S1S2, no murmur or rubs  Gastrointestinal: Soft, non-tender +Bowel sounds   Extremities: 2+ Peripheral Pulses, no cyanosis, or edema  Psychiatric: Calm, no agitation   Skin: Warm and dry, no ulcers on extremities     LABS:  ( 03 Sep 2023 03:06 )  Troponin T  <0.01,  CPK  X    , CKMB  X    , BNP X                           14.2   7.95  )-----------( 188      ( 03 Sep 2023 03:06 )             40.4         135  |  99  |  21.1<H>  ----------------------------<  154<H>  4.6   |  22.0  |  1.22    Ca    9.3      03 Sep 2023 03:06  Mg     1.9         TPro  7.0  /  Alb  4.3  /  TBili  0.3<L>  /  DBili  x   /  AST  69<H>  /  ALT  65<H>  /  AlkPhos  87      Urinalysis Basic - ( 03 Sep 2023 04:03 )  Color: Yellow / Appearance: Clear / S.020 / pH: x  Gluc: x / Ketone: Trace  / Bili: Negative / Urobili: Negative mg/dL   Blood: x / Protein: Negative / Nitrite: Negative   Leuk Esterase: Negative / RBC: x / WBC x   Sq Epi: x / Non Sq Epi: x / Bacteria: x    Thyroid Stimulating Hormone, Serum: 0.78 uIU/mL (23 @ 03:06)    INTERPRETATION OF TELEMETRY: A fib   ECG: Afib RVR  Prior ECG: Yes [  ] No [x]    RADIOLOGY & ADDITIONAL STUDIES:   X-ray:  Pen                                              [facility]                                              CARDIOLOGY AT [place] [address]                                             Telephone: [phone]. Fax:[phone]                                                       CARDIOLOGY CONSULTATION NOTE                                                                                             History obtained by: Patient and medical record  Community Cardiologist:  None   obtained: Yes [  ] No [  ]  Reason for Consultation: syncope  Available out pt records reviewed: Yes [x] No [  ]    Chief complaint:  Syncope    HPI: Patient is a 29yo M w/ PMHx of thyroid nodule s/p resection on daily Synthroid presents to [facility] after a syncopal event.  Patient was leaving a family barbecue around 2AM, when he started having blurry vision and feeling like he was "going to pass out."  Started to walk, became unsteady and fell into a clock on the wall, eventually landing on the the ground.  Event witnessed by patient's brother who helped him to sit up right afterwards.  EMS called and on arrival to [facility] patient c/o "feeling cold and thirsty."  On Tele monitor new onset arrhythmia A-fib w/ RVR detected.  HR between 108-130s currently on IVF.  Patient otherwise denies chest pain, dyspnea, palpitations, diaphoresis, HA, slurred speech, incontinence, neck, back or abdominal pain.    Admits to having few alcoholic beverages at the barbecue.  States he works out daily in a gym, denies tobacco or illicit drug use.     CARDIAC TESTING   ECHO: Pen  STRESS:  CATH:   ELECTROPHYSIOLOGY:     PAST MEDICAL HISTORY  Thyroid nodule    PAST SURGICAL HISTORY  H/O umbilical hernia repair  S/P thyroid biopsy    SOCIAL HISTORY:  Denies smoking/alcohol/drugs    FAMILY HISTORY: hypertension, diabetes mellitus (Maternal and paternal grandparents)  prostate cancer gradfather    Family History of Cardiovascular Disease:  Yes [  ] No [  ]  Coronary Artery Disease in first degree relative: Yes [  ] No [  ]  Sudden Cardiac Death in First degree relative: Yes [  ] No [  ]    HOME MEDICATIONS:  Synthroid 150mcg daily    CURRENT OTHER MEDICATIONS:  lactated ringers Bolus 1000 milliLiter(s) IV Bolus once, Stop order after: 1 Doses    ALLERGIES: penicillins (Rash)    REVIEW OF SYMPTOMS:   CONSTITUTIONAL: No fever, + chills, no weight loss, + thirsty, no fatigue   ENMT: No sinus or throat pain  NECK: No pain or stiffness  CARDIOVASCULAR: no chest pain, no dyspnea, + syncope/presyncope, no palpitations, no dizziness, no Orthopnea, no Paroxsymal nocturnal dyspnea  RESPIRATORY: no Shortness of breath, no cough, no wheezing  : No dysuria, no hematuria   GI: No dark color stool, no nausea, no diarrhea, no constipation, no abdominal pain   NEURO: No headache, no slurred speech   MUSCULOSKELETAL: No joint pain or swelling; No muscle, back, or extremity pain  PSYCH: No agitation, no anxiety    ALL OTHER REVIEW OF SYSTEMS ARE NEGATIVE.    VITAL SIGNS:  T(C): 36.7 (23 @ 02:52), Max: 36.7 (23 @ 02:52)  T(F): 98 (23 @ 02:52), Max: 98 (23 @ 02:52)  HR: 98 (23 @ 04:15) (96 - 120)  BP: 129/64 (23 @ 04:15) (116/71 - 129/64)  RR: 16 (23 @ 04:15) (16 - 20)  SpO2: 100% (23 @ 04:15) (97% - 100%)    INTAKE AND OUTPUT:     PHYSICAL EXAM:  Constitutional: Comfortable, no acute distress   HEENT: Atraumatic, neck is supple, no JVD  CNS: A&Ox3. No focal deficits   Respiratory: CTAB, unlabored   Cardiovascular: + tachycardic, Irreg/irreg, S1S2, no murmur or rubs  Gastrointestinal: Soft, non-tender +Bowel sounds   Extremities: 2+ Peripheral Pulses, no cyanosis, or edema  Psychiatric: Calm, no agitation   Skin: Warm and dry, no ulcers on extremities     LABS:  ( 03 Sep 2023 03:06 )  Troponin T  <0.01,  CPK  X    , CKMB  X    , BNP X                           14.2   7.95  )-----------( 188      ( 03 Sep 2023 03:06 )             40.4         135  |  99  |  21.1<H>  ----------------------------<  154<H>  4.6   |  22.0  |  1.22    Ca    9.3      03 Sep 2023 03:06  Mg     1.9         TPro  7.0  /  Alb  4.3  /  TBili  0.3<L>  /  DBili  x   /  AST  69<H>  /  ALT  65<H>  /  AlkPhos  87      Urinalysis Basic - ( 03 Sep 2023 04:03 )  Color: Yellow / Appearance: Clear / S.020 / pH: x  Gluc: x / Ketone: Trace  / Bili: Negative / Urobili: Negative mg/dL   Blood: x / Protein: Negative / Nitrite: Negative   Leuk Esterase: Negative / RBC: x / WBC x   Sq Epi: x / Non Sq Epi: x / Bacteria: x    Thyroid Stimulating Hormone, Serum: 0.78 uIU/mL (23 @ 03:06)    INTERPRETATION OF TELEMETRY: A fib w/ RVR  ECG: Afib RVR  Prior ECG: Yes [  ] No [x]    RADIOLOGY & ADDITIONAL STUDIES:   X-ray:  Pen

## 2023-09-03 NOTE — ED CDU PROVIDER DISPOSITION NOTE - CARE PROVIDER_API CALL
Shante Ritchie  Cardiology  [address] [place] [address]  Phone: [phone]  Fax: [phone]  Follow Up Time:

## 2023-09-03 NOTE — ED ADULT NURSE NOTE - NSFALLRISKINTERV_ED_ALL_ED

## 2023-09-03 NOTE — ED PROVIDER NOTE - CARE PLAN
1 Principal Discharge DX:	Atrial fibrillation, new onset  Secondary Diagnosis:	Atrial fibrillation, new onset

## 2023-09-03 NOTE — ED ADULT NURSE REASSESSMENT NOTE - NS ED NURSE REASSESS COMMENT FT1
pt moved to A7L observation, report given to RYAN arshad. pt aware of POC. VS as charted, RR wnl. no complaints at this time.

## 2023-09-03 NOTE — CONSULT NOTE ADULT - ASSESSMENT
27yo M w/ hypothyroidism presents with near syncope and new onset A-fib w/ RVR.  Hemodynamically stable.  EKG A-fib w/ RVR.  CEx1 negative.    29yo M w/ hypothyroidism presents with witnessed syncopal event and new onset A-fib w/ RVR.  Hemodynamically stable.  EKG A-fib w/ RVR.  CEx1 negative.

## 2023-09-03 NOTE — ED CDU PROVIDER INITIAL DAY NOTE - OBJECTIVE STATEMENT
27yo M w/ PMHx of thyroid nodule s/p resection on daily Synthroid presents to [facility] after a near syncopal event.  Patient was leaving a family barbecue around 2AM, when he started having blurry vision and feeling like he was "going to pass out."  Started to walk, became unsteady and fell into a clock on the wall, eventually landing on the ground.  Event witnessed by patient's brother who helped him to sit up right afterwards.  EMS called and on arrival to [facility] patient c/o "feeling cold and thirsty."  On Tele monitor new onset arrhythmia A-fib w/ RVR detected.  pt admit has some cranberry juice and vodka . he was feeling dizzy but he did not passed out - now feeling better .  denies any fmily hx of CAD

## 2023-09-25 ENCOUNTER — NON-APPOINTMENT (OUTPATIENT)
Age: 28
End: 2023-09-25

## 2023-09-25 ENCOUNTER — APPOINTMENT (OUTPATIENT)
Dept: CARDIOLOGY | Facility: CLINIC | Age: 28
End: 2023-09-25
Payer: COMMERCIAL

## 2023-09-25 VITALS
WEIGHT: 186 LBS | BODY MASS INDEX: 28.19 KG/M2 | SYSTOLIC BLOOD PRESSURE: 118 MMHG | DIASTOLIC BLOOD PRESSURE: 76 MMHG | HEIGHT: 68 IN | HEART RATE: 110 BPM | OXYGEN SATURATION: 99 %

## 2023-09-25 DIAGNOSIS — Z09 ENCOUNTER FOR FOLLOW-UP EXAMINATION AFTER COMPLETED TREATMENT FOR CONDITIONS OTHER THAN MALIGNANT NEOPLASM: ICD-10-CM

## 2023-09-25 PROCEDURE — 99215 OFFICE O/P EST HI 40 MIN: CPT | Mod: 25

## 2023-09-25 PROCEDURE — 93270 REMOTE 30 DAY ECG REV/REPORT: CPT

## 2023-09-25 PROCEDURE — 93000 ELECTROCARDIOGRAM COMPLETE: CPT | Mod: 59

## 2023-10-03 ENCOUNTER — APPOINTMENT (OUTPATIENT)
Dept: CARDIOLOGY | Facility: CLINIC | Age: 28
End: 2023-10-03

## 2023-10-07 ENCOUNTER — LABORATORY RESULT (OUTPATIENT)
Age: 28
End: 2023-10-07

## 2023-10-10 LAB
T4 FREE SERPL-MCNC: 1.9 NG/DL
TSH SERPL-ACNC: 6.13 UIU/ML

## 2023-10-11 ENCOUNTER — APPOINTMENT (OUTPATIENT)
Dept: ELECTROPHYSIOLOGY | Facility: CLINIC | Age: 28
End: 2023-10-11
Payer: COMMERCIAL

## 2023-10-11 VITALS
BODY MASS INDEX: 28.19 KG/M2 | WEIGHT: 186 LBS | HEART RATE: 84 BPM | HEIGHT: 68 IN | OXYGEN SATURATION: 95 % | DIASTOLIC BLOOD PRESSURE: 64 MMHG | SYSTOLIC BLOOD PRESSURE: 130 MMHG

## 2023-10-11 PROCEDURE — 93000 ELECTROCARDIOGRAM COMPLETE: CPT

## 2023-10-11 PROCEDURE — 99203 OFFICE O/P NEW LOW 30 MIN: CPT | Mod: 25

## 2023-10-16 ENCOUNTER — NON-APPOINTMENT (OUTPATIENT)
Age: 28
End: 2023-10-16

## 2023-10-20 ENCOUNTER — APPOINTMENT (OUTPATIENT)
Dept: ENDOCRINOLOGY | Facility: CLINIC | Age: 28
End: 2023-10-20
Payer: COMMERCIAL

## 2023-10-20 VITALS
BODY MASS INDEX: 28.79 KG/M2 | HEART RATE: 75 BPM | SYSTOLIC BLOOD PRESSURE: 128 MMHG | HEIGHT: 68 IN | WEIGHT: 190 LBS | DIASTOLIC BLOOD PRESSURE: 74 MMHG | OXYGEN SATURATION: 99 %

## 2023-10-20 PROCEDURE — 99214 OFFICE O/P EST MOD 30 MIN: CPT

## 2023-10-25 LAB
T4 FREE SERPL-MCNC: 1.5 NG/DL
TSH SERPL-ACNC: 18 UIU/ML

## 2023-10-30 ENCOUNTER — APPOINTMENT (OUTPATIENT)
Dept: CT IMAGING | Facility: CLINIC | Age: 28
End: 2023-10-30
Payer: COMMERCIAL

## 2023-10-30 ENCOUNTER — OUTPATIENT (OUTPATIENT)
Dept: OUTPATIENT SERVICES | Facility: HOSPITAL | Age: 28
LOS: 1 days | End: 2023-10-30
Payer: COMMERCIAL

## 2023-10-30 DIAGNOSIS — Z98.890 OTHER SPECIFIED POSTPROCEDURAL STATES: Chronic | ICD-10-CM

## 2023-10-30 DIAGNOSIS — I48.0 PAROXYSMAL ATRIAL FIBRILLATION: ICD-10-CM

## 2023-10-30 PROCEDURE — 75574 CT ANGIO HRT W/3D IMAGE: CPT | Mod: 26

## 2023-10-30 PROCEDURE — 75574 CT ANGIO HRT W/3D IMAGE: CPT

## 2023-11-10 ENCOUNTER — NON-APPOINTMENT (OUTPATIENT)
Age: 28
End: 2023-11-10

## 2023-11-28 ENCOUNTER — APPOINTMENT (OUTPATIENT)
Dept: CARDIOLOGY | Facility: CLINIC | Age: 28
End: 2023-11-28
Payer: COMMERCIAL

## 2023-11-28 VITALS
HEIGHT: 68 IN | DIASTOLIC BLOOD PRESSURE: 80 MMHG | HEART RATE: 79 BPM | SYSTOLIC BLOOD PRESSURE: 122 MMHG | OXYGEN SATURATION: 98 % | WEIGHT: 191.6 LBS | BODY MASS INDEX: 29.04 KG/M2

## 2023-11-28 DIAGNOSIS — E03.9 HYPOTHYROIDISM, UNSPECIFIED: ICD-10-CM

## 2023-11-28 PROCEDURE — 99214 OFFICE O/P EST MOD 30 MIN: CPT

## 2024-01-11 ENCOUNTER — RX RENEWAL (OUTPATIENT)
Age: 29
End: 2024-01-11

## 2024-01-12 ENCOUNTER — APPOINTMENT (OUTPATIENT)
Dept: ELECTROPHYSIOLOGY | Facility: CLINIC | Age: 29
End: 2024-01-12
Payer: COMMERCIAL

## 2024-01-12 ENCOUNTER — NON-APPOINTMENT (OUTPATIENT)
Age: 29
End: 2024-01-12

## 2024-01-12 VITALS
WEIGHT: 189 LBS | DIASTOLIC BLOOD PRESSURE: 60 MMHG | HEART RATE: 74 BPM | SYSTOLIC BLOOD PRESSURE: 124 MMHG | OXYGEN SATURATION: 99 % | HEIGHT: 68 IN | BODY MASS INDEX: 28.64 KG/M2

## 2024-01-12 LAB
T4 FREE SERPL-MCNC: 1.9 NG/DL
TSH SERPL-ACNC: 2.16 UIU/ML

## 2024-01-12 PROCEDURE — 99214 OFFICE O/P EST MOD 30 MIN: CPT | Mod: 25

## 2024-01-12 PROCEDURE — 93000 ELECTROCARDIOGRAM COMPLETE: CPT

## 2024-01-12 NOTE — HISTORY OF PRESENT ILLNESS
[FreeTextEntry1] : In summary, the patient is a 28-year-old male here for follow up today. The patient has a history of thyroid ca s/p resection on Synthroid and syncope. He presented to the ER in 9/2023 after a syncopal episode. He had eaten very little that day and had been binge drinking. When EMS arrived, he was noted to be in atrial fibrillation with RVR which self-converted to sinus rhythm spontaneously. TTE revealed preserved LVEF. On outpatient monitor no AF was seen but he had one episode of NSVT which was 8 beats long. A CCTA was performed on 10/30/23 which was revealed normal coronaries. He was started on metoprolol 25 mg daily. He denies any further syncopal episodes. He has no family history of sudden death but states that uncle had low ejection fraction (does not have a device) when he was in his 30s. He is in his 40s now.

## 2024-01-12 NOTE — DISCUSSION/SUMMARY
[EKG obtained to assist in diagnosis and management of assessed problem(s)] : EKG obtained to assist in diagnosis and management of assessed problem(s) [FreeTextEntry1] : In summary, the patient is a 28-year-old male with a history of syncope and atrial fibrillation (single episode) in the setting of binge drinking. He wore an outpatient monitor which did not reveal any further AF but one episode of NSVT was seen (8 beats long). LVEF is normal per TTE. He was started on metoprolol. He does have some family history of possibly cardiomyopathy (uncle). I will order a cardiac MRI to assess for LV scar. If MRI is negative for scar would proceed with ILR implant and may also need genetic testing.

## 2024-01-16 ENCOUNTER — APPOINTMENT (OUTPATIENT)
Dept: ENDOCRINOLOGY | Facility: CLINIC | Age: 29
End: 2024-01-16
Payer: COMMERCIAL

## 2024-01-16 VITALS
HEIGHT: 68 IN | SYSTOLIC BLOOD PRESSURE: 116 MMHG | HEART RATE: 82 BPM | WEIGHT: 191 LBS | BODY MASS INDEX: 28.95 KG/M2 | DIASTOLIC BLOOD PRESSURE: 64 MMHG

## 2024-01-16 PROCEDURE — 99214 OFFICE O/P EST MOD 30 MIN: CPT

## 2024-01-16 NOTE — ASSESSMENT
[FreeTextEntry1] : 28 year old male diagnosed with thyroid cancer in 2019. At first underwent hemithyroidectomy and then underwent completion thyroidectomy later that year at [facility] with Dr Edwards. Path: s/p total thyroidectomy, no adenopathy. No COLIN needed, Unstim Tg low  - VALERIANO guidelines reviewed - low risk of recurrence based on pathology, to date KIMANI on sonogram and with low Tg levels with neg Tg ab, excellent response to therapy, Goal TSH 0.5-2, TSH improved  - cont LT4 200 mcg daily for now - periodic neck sono - cont to monitor unstim Tg levels periodically - repeat levels in 3 months

## 2024-01-16 NOTE — PHYSICAL EXAM
[Alert] : alert [No Acute Distress] : no acute distress [EOMI] : extra ocular movement intact [Well Healed Scar] : well healed scar [No Respiratory Distress] : no respiratory distress [Clear to Auscultation] : lungs were clear to auscultation bilaterally [No Tremors] : no tremors [Oriented x3] : oriented to person, place, and time [Normal Affect] : the affect was normal [Normal Insight/Judgement] : insight and judgment were intact [Normal Mood] : the mood was normal [No LAD] : no lymphadenopathy

## 2024-01-16 NOTE — REVIEW OF SYSTEMS
[Recent Weight Gain (___ Lbs)] : recent weight gain: [unfilled] lbs [Palpitations] : no palpitations [Constipation] : no constipation [Diarrhea] : no diarrhea [Joint Pain] : no joint pain [Myalgia] : no myalgia  [Tremors] : no tremors [Depression] : no depression [Anxiety] : no anxiety [Cold Intolerance] : no cold intolerance [Heat Intolerance] : no heat intolerance

## 2024-01-16 NOTE — HISTORY OF PRESENT ILLNESS
[FreeTextEntry1] : Interval hx - no issues, no changes  In 2019 he was diagnosed with thyroid cancer. At first underwent hemithyroidectomy and then underwent completion thyroidectomy later that year at [facility] with Dr Edwards.  No post surgical complications.  He did not need COLIN.  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 Tumor size 2.5 cm. Stage T2, Nx  5/2019 Left thyroid lobectomy: adenomatous hyperplasia, parathyroid tissue, no tumor  5/2019 Ca 9.7, iPTH 38 7/2019 Ca 9.4, iPTH 58  10/2019 neck sono: s/p total thyroidectomy, no adenopathy 3/2021: neck sono: s/p total thyroidectomy, no adenopathy 9/2021:neck sono: s/p total thyroidectomy, no adenopathy 7/2022 neck sono - s/p total thyroidectomy, no adenopathy 10/2023 neck sono - s/p total thyroidectomy, benign lymph node  He has been on varying doses of  thyroid hormone since 2019,  For the past year he has been on LT4 200 mcg 1 tab daily and dose decreased to 175 mcg daily at 3/2023 visit.  He takes his thyroid medication daily on empty stomach.  No family thyroid cancer, sister with hashimoto's thyroid disease

## 2024-02-06 ENCOUNTER — RESULT REVIEW (OUTPATIENT)
Age: 29
End: 2024-02-06

## 2024-02-06 ENCOUNTER — APPOINTMENT (OUTPATIENT)
Dept: MRI IMAGING | Facility: CLINIC | Age: 29
End: 2024-02-06
Payer: COMMERCIAL

## 2024-02-06 ENCOUNTER — OUTPATIENT (OUTPATIENT)
Dept: OUTPATIENT SERVICES | Facility: HOSPITAL | Age: 29
LOS: 1 days | End: 2024-02-06

## 2024-02-06 DIAGNOSIS — Z98.890 OTHER SPECIFIED POSTPROCEDURAL STATES: Chronic | ICD-10-CM

## 2024-02-06 DIAGNOSIS — I47.29 OTHER VENTRICULAR TACHYCARDIA: ICD-10-CM

## 2024-02-06 PROCEDURE — 75561 CARDIAC MRI FOR MORPH W/DYE: CPT | Mod: 26

## 2024-02-06 PROCEDURE — 75565 CARD MRI VELOC FLOW MAPPING: CPT | Mod: 26

## 2024-02-07 NOTE — DISCUSSION/SUMMARY
[FreeTextEntry1] : In summary, the patient is a 28-year-old male with syncope in the setting of binge drinking subsequently found to be in atrial fibrillation with RVR. TTE reveals normal function. and baseline ECG is normal. Although not certain syncope could have resulted from a combination of dehydration, drinking, and a rapid ventricular rate in AF.  He is currently wearing an event monitor. If monitoring is unrevealing no additional evaluation will be required other than avoidance of alcohol use in the future. Further testing would depend on monitor findings.  [EKG obtained to assist in diagnosis and management of assessed problem(s)] : EKG obtained to assist in diagnosis and management of assessed problem(s)

## 2024-02-07 NOTE — HISTORY OF PRESENT ILLNESS
[FreeTextEntry1] : The patient is a 28-year-old male referred for arrhythmia management. The patient has a history of thyroid ca s/p resection on Synthroid and syncope. He presented to the ER in 9/2023 after a syncopal episode. He had eaten very little that day and had been binge drinking. When EMS arrived, he was noted to be in atrial fibrillation with RVR which self-converted to sinus rhythm spontaneously. TTE revealed preserved LVEF. Baseline ECG was unremarkable. He denies any further syncopal episodes. He has no prior history of syncope or palpitations. He has no family history of sudden death but states that uncle had low ejection fraction (but no device) when he was in his 30s. He is in his 40s now and alive.

## 2024-03-05 ENCOUNTER — APPOINTMENT (OUTPATIENT)
Dept: CARDIOLOGY | Facility: CLINIC | Age: 29
End: 2024-03-05
Payer: COMMERCIAL

## 2024-03-05 VITALS
HEART RATE: 86 BPM | HEIGHT: 68 IN | SYSTOLIC BLOOD PRESSURE: 116 MMHG | DIASTOLIC BLOOD PRESSURE: 68 MMHG | WEIGHT: 192 LBS | OXYGEN SATURATION: 99 % | BODY MASS INDEX: 29.1 KG/M2

## 2024-03-05 PROCEDURE — 99214 OFFICE O/P EST MOD 30 MIN: CPT

## 2024-03-07 ENCOUNTER — NON-APPOINTMENT (OUTPATIENT)
Age: 29
End: 2024-03-07

## 2024-03-07 NOTE — CARDIOLOGY SUMMARY
[de-identified] : 1/2024: Sinus Rhythm  -Poor R-wave progression -nondiagnostic for this age.  [de-identified] : 9/3/2023 EF 55-60%   [de-identified] : CCTA: Normal coronary arteries.  cMRI: . Normal-sized left ventricle with global left ventricular wall hypokinesia and moderately depressed systolic function. Left ventricular ejection fraction of 40%.  2. Normal-sized right ventricle with mildly depressed systolic function. Right ventricular ejection fraction of 38%.  3.  No evidence of focal abnormal late gadolinium enhancement to suggest fibrous changes/scarring.

## 2024-03-07 NOTE — HISTORY OF PRESENT ILLNESS
[FreeTextEntry1] : 28 year old male, non smoker, with a past medical history of thyroid cancer s/p resection on Synthroid, pAF CHADS-VASc 0 presents for follow up and now with acute HFref (lvef 40%)   Patient notes that he has been doing well with no chest pain or shortness of breath at rest or upon exertion.  Patient has since seen Dr. Asif electrophysiologist and divulged that  he has no family history of sudden death but states that uncle had low ejection fraction (does not have a device) when he was in his 30s. He is in his 40s now. Patient is currently cardiac stable but cMRI done shows LVEF of 40% with no evidence of scar n

## 2024-03-07 NOTE — ASSESSMENT
[FreeTextEntry1] : 28 year old male, non smoker, with a past medical history of thyroid cancer s/p resection on Synthroid, pAF CHADS-VASc 0 presents for follow up and now with acute HFref (lvef 40%)   1) HFrEF LVEF 40%, NYHA Class I / ACC/AHA Stage B  - no acute chest pain or shortness of breath and appear euvolemic  - no EKG done this visit  - cMRI shows LVEF 40%, will need to assess if genetic predisposition - will obtain genetic testing  - check Iron panel Rheumatoid LEVI and amyloid work up  - will refer to Advanced heart failure for further work up  - will start Entresto 24/26mg po BID and on Toprol XL 25mg po q daily   - disucssed with EP and no further work up from their standpoint

## 2024-03-07 NOTE — PHYSICAL EXAM
[Well Developed] : well developed [Well Nourished] : well nourished [No Acute Distress] : no acute distress [Normal Conjunctiva] : normal conjunctiva [Normal Venous Pressure] : normal venous pressure [No Carotid Bruit] : no carotid bruit [Normal S1, S2] : normal S1, S2 [No Rub] : no rub [No Murmur] : no murmur [No Gallop] : no gallop [Clear Lung Fields] : clear lung fields [Good Air Entry] : good air entry [No Respiratory Distress] : no respiratory distress  [Non Tender] : non-tender [Soft] : abdomen soft [No Masses/organomegaly] : no masses/organomegaly [Normal Bowel Sounds] : normal bowel sounds [Normal Gait] : normal gait [No Edema] : no edema [No Cyanosis] : no cyanosis [No Clubbing] : no clubbing [No Varicosities] : no varicosities [No Rash] : no rash [No Skin Lesions] : no skin lesions [Moves all extremities] : moves all extremities [No Focal Deficits] : no focal deficits [Normal Speech] : normal speech [Normal memory] : normal memory [Alert and Oriented] : alert and oriented

## 2024-03-16 ENCOUNTER — LABORATORY RESULT (OUTPATIENT)
Age: 29
End: 2024-03-16

## 2024-03-19 LAB
ANION GAP SERPL CALC-SCNC: 13 MMOL/L
BUN SERPL-MCNC: 13 MG/DL
CALCIUM SERPL-MCNC: 9.4 MG/DL
CHLORIDE SERPL-SCNC: 102 MMOL/L
CO2 SERPL-SCNC: 24 MMOL/L
CREAT SERPL-MCNC: 1.06 MG/DL
EGFR: 98 ML/MIN/1.73M2
FERRITIN SERPL-MCNC: 282 NG/ML
GLUCOSE SERPL-MCNC: 93 MG/DL
IRON SATN MFR SERPL: 30 %
IRON SERPL-MCNC: 89 UG/DL
POTASSIUM SERPL-SCNC: 4.3 MMOL/L
RHEUMATOID FACT SER QL: <10 IU/ML
SODIUM SERPL-SCNC: 140 MMOL/L
T4 FREE SERPL-MCNC: 2.2 NG/DL
TIBC SERPL-MCNC: 298 UG/DL
TSH SERPL-ACNC: 0.37 UIU/ML
UIBC SERPL-MCNC: 209 UG/DL

## 2024-03-21 LAB
ALBUMIN MFR SERPL ELPH: 60 %
ALBUMIN SERPL-MCNC: 4.7 G/DL
ALBUMIN/GLOB SERPL: 1.5 RATIO
ALPHA1 GLOB MFR SERPL ELPH: 3.5 %
ALPHA1 GLOB SERPL ELPH-MCNC: 0.3 G/DL
ALPHA2 GLOB MFR SERPL ELPH: 7.9 %
ALPHA2 GLOB SERPL ELPH-MCNC: 0.6 G/DL
ANA SER IF-ACNC: NEGATIVE
B-GLOBULIN MFR SERPL ELPH: 9.7 %
B-GLOBULIN SERPL ELPH-MCNC: 0.8 G/DL
DEPRECATED KAPPA LC FREE/LAMBDA SER: 1.43 RATIO
GAMMA GLOB FLD ELPH-MCNC: 1.5 G/DL
GAMMA GLOB MFR SERPL ELPH: 18.9 %
IGA SER QL IEP: 144 MG/DL
IGG SER QL IEP: 1452 MG/DL
IGM SER QL IEP: 70 MG/DL
INTERPRETATION SERPL IEP-IMP: NORMAL
KAPPA LC CSF-MCNC: 1.29 MG/DL
KAPPA LC SERPL-MCNC: 1.85 MG/DL
PROT SERPL-MCNC: 7.9 G/DL
PROT SERPL-MCNC: 7.9 G/DL

## 2024-04-16 ENCOUNTER — APPOINTMENT (OUTPATIENT)
Dept: ENDOCRINOLOGY | Facility: CLINIC | Age: 29
End: 2024-04-16
Payer: COMMERCIAL

## 2024-04-16 VITALS
OXYGEN SATURATION: 98 % | WEIGHT: 189 LBS | BODY MASS INDEX: 28.64 KG/M2 | HEART RATE: 90 BPM | HEIGHT: 68 IN | SYSTOLIC BLOOD PRESSURE: 120 MMHG | DIASTOLIC BLOOD PRESSURE: 82 MMHG

## 2024-04-16 DIAGNOSIS — C73 MALIGNANT NEOPLASM OF THYROID GLAND: ICD-10-CM

## 2024-04-16 DIAGNOSIS — E89.0 POSTPROCEDURAL HYPOTHYROIDISM: ICD-10-CM

## 2024-04-16 PROCEDURE — 99214 OFFICE O/P EST MOD 30 MIN: CPT

## 2024-04-16 RX ORDER — LEVOTHYROXINE SODIUM 175 UG/1
175 TABLET ORAL DAILY
Qty: 90 | Refills: 1 | Status: ACTIVE | COMMUNITY
Start: 2021-12-15 | End: 1900-01-01

## 2024-04-16 NOTE — PHYSICAL EXAM
[Alert] : alert [No Acute Distress] : no acute distress [EOMI] : extra ocular movement intact [No LAD] : no lymphadenopathy [Well Healed Scar] : well healed scar [No Respiratory Distress] : no respiratory distress [Clear to Auscultation] : lungs were clear to auscultation bilaterally [No Tremors] : no tremors [Oriented x3] : oriented to person, place, and time [Normal Affect] : the affect was normal [Normal Insight/Judgement] : insight and judgment were intact [Normal Mood] : the mood was normal [Normal S1, S2] : normal S1 and S2 [Normal Rate] : heart rate was normal

## 2024-04-16 NOTE — HISTORY OF PRESENT ILLNESS
[FreeTextEntry1] : Interval hx - dx with HFrEF, Class 1  In 2019 he was diagnosed with thyroid cancer. At first underwent hemithyroidectomy and then underwent completion thyroidectomy later that year at [facility] with Dr Edwards.  No post-surgical complications.  He did not need COLIN.  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 Tumor size 2.5 cm. Stage T2, Nx  5/2019 Left thyroid lobectomy: adenomatous hyperplasia, parathyroid tissue, no tumor  5/2019 Ca 9.7, iPTH 38 7/2019 Ca 9.4, iPTH 58  10/2019 neck sono: s/p total thyroidectomy, no adenopathy 3/2021: neck sono: s/p total thyroidectomy, no adenopathy 9/2021:neck sono: s/p total thyroidectomy, no adenopathy 7/2022 neck sono - s/p total thyroidectomy, no adenopathy 10/2023 neck sono - s/p total thyroidectomy, benign lymph node  He has been on varying doses of  thyroid hormone since 2019,  For the past year he has been on LT4 200 mcg 1 tab daily and dose decreased to 175 mcg daily at 3/2023 visit.  He takes his thyroid medication daily on empty stomach.  No family thyroid cancer, sister with hashimoto's thyroid disease

## 2024-04-17 NOTE — ED ADULT NURSE NOTE - NSICDXPASTSURGICALHX_GEN_ALL_CORE_FT
supervision
PAST SURGICAL HISTORY:  H/O umbilical hernia repair     S/P thyroid biopsy needle biopsy

## 2024-04-22 ENCOUNTER — RX RENEWAL (OUTPATIENT)
Age: 29
End: 2024-04-22

## 2024-04-29 ENCOUNTER — RX RENEWAL (OUTPATIENT)
Age: 29
End: 2024-04-29

## 2024-04-29 RX ORDER — METOPROLOL SUCCINATE 25 MG/1
25 TABLET, EXTENDED RELEASE ORAL
Qty: 90 | Refills: 3 | Status: ACTIVE | COMMUNITY
Start: 2023-10-16 | End: 1900-01-01

## 2024-05-07 ENCOUNTER — APPOINTMENT (OUTPATIENT)
Dept: CARDIOLOGY | Facility: CLINIC | Age: 29
End: 2024-05-07
Payer: COMMERCIAL

## 2024-05-07 VITALS
HEIGHT: 68 IN | OXYGEN SATURATION: 99 % | HEART RATE: 93 BPM | SYSTOLIC BLOOD PRESSURE: 100 MMHG | BODY MASS INDEX: 28.04 KG/M2 | DIASTOLIC BLOOD PRESSURE: 62 MMHG | WEIGHT: 185 LBS

## 2024-05-07 DIAGNOSIS — I48.0 PAROXYSMAL ATRIAL FIBRILLATION: ICD-10-CM

## 2024-05-07 PROCEDURE — 99213 OFFICE O/P EST LOW 20 MIN: CPT

## 2024-05-08 ENCOUNTER — APPOINTMENT (OUTPATIENT)
Dept: ELECTROPHYSIOLOGY | Facility: CLINIC | Age: 29
End: 2024-05-08
Payer: COMMERCIAL

## 2024-05-08 ENCOUNTER — NON-APPOINTMENT (OUTPATIENT)
Age: 29
End: 2024-05-08

## 2024-05-08 VITALS
HEART RATE: 94 BPM | WEIGHT: 185 LBS | BODY MASS INDEX: 28.04 KG/M2 | SYSTOLIC BLOOD PRESSURE: 106 MMHG | DIASTOLIC BLOOD PRESSURE: 64 MMHG | HEIGHT: 68 IN | OXYGEN SATURATION: 98 %

## 2024-05-08 DIAGNOSIS — R55 SYNCOPE AND COLLAPSE: ICD-10-CM

## 2024-05-08 PROCEDURE — 99214 OFFICE O/P EST MOD 30 MIN: CPT | Mod: 25

## 2024-05-08 PROCEDURE — 93000 ELECTROCARDIOGRAM COMPLETE: CPT

## 2024-05-15 NOTE — HISTORY OF PRESENT ILLNESS
[FreeTextEntry1] : In summary, the patient is a 28-year-old male here for follow up today. The patient has a history of thyroid ca s/p resection on Synthroid and syncope. He presented to the ER in 9/2023 after a syncopal episode. He had eaten very little that day and had been binge drinking. When EMS arrived, he was noted to be in atrial fibrillation with RVR which self-converted to sinus rhythm spontaneously. TTE revealed preserved LVEF. On outpatient monitor no AF was seen but he had one episode of NSVT which was 8 beats long. A CCTA was performed on 10/30/23 which was revealed normal coronaries. He was started on metoprolol 25 mg daily. He denies any further syncopal episodes. He has no family history of sudden death but states that uncle had low ejection fraction (does not have a device) when he was in his 30s. He is in his 40s now.  Cardiac MRI revealed EF 40%, No lge. Normal RV size with mildly depressed systolic function. RV EF 38%.   Saw Dr. Ritchie, medical therapy initiated 3/5/24. Genetic testing ordered. No clinically significant variants detected.   Today, Mr. Funez reports he has been feeling well since last follow up. Denies palpitations, dizziness, near syncope, syncope. ECG reveals SR.

## 2024-05-15 NOTE — END OF VISIT
[Time Spent: ___ minutes] : I have spent [unfilled] minutes of time on the encounter. [FreeTextEntry3] : I, Dr. Asif, personally performed the evaluation and management (E/M) services for this new patient. That E/M includes conducting the clinically appropriate initial history &/or exam, assessing all conditions, and establishing the plan of care. Today, my assistant, Elina Cruz NP, was here to observe my evaluation and management service for this patient & follow plan of care established by me going forward.

## 2024-05-15 NOTE — DISCUSSION/SUMMARY
[EKG obtained to assist in diagnosis and management of assessed problem(s)] : EKG obtained to assist in diagnosis and management of assessed problem(s) [FreeTextEntry1] : In summary, the patient is a 28-year-old male here for follow up today. The patient has a history of thyroid ca s/p resection on Synthroid and syncope. He presented to the ER in 9/2023 after a syncopal episode. He had eaten very little that day and had been binge drinking. When EMS arrived, he was noted to be in atrial fibrillation with RVR which self-converted to sinus rhythm spontaneously. TTE revealed preserved LVEF. On outpatient monitor no AF was seen but he had one episode of NSVT which was 8 beats long. A CCTA was performed on 10/30/23 which was revealed normal coronaries. He was started on metoprolol 25 mg daily. He denies any further syncopal episodes. He has no family history of sudden death but states that uncle had low ejection fraction (does not have a device) when he was in his 30s. He is in his 40s now.  Cardiac MRI revealed EF 40%, No lge. Normal RV size with mildly depressed systolic function. RV EF 38%.   Saw Dr. Ritchie, medical therapy initiated 3/5/24. Genetic testing ordered. No clinically significant variants detected.   Today, Mr. Funez reports he has been feeling well since last follow up. Denies palpitations, dizziness, near syncope, syncope. ECG reveals SR.  Recommendation:   Mr. Funez reports he has been feeling well since last follow up and denies recurrent syncope. History of syncope, now with documented LV dysfunction on cardiac MRI (no LGE), and NSVT on external monitoring. GDMT initiated by Dr. Ritchie, with repeat echo pending next month. The benefits of long term arrhythmia surveillance discussed to evaluate for both atrial and ventricular arrhythmias. Risks/benefits of ILR implant as well as billing considerations reviewed. He wishes to proceed. TO arrange at [facility] with Dr. Asif.  Lara HINDS

## 2024-05-15 NOTE — REVIEW OF SYSTEMS
[Feeling Fatigued] : not feeling fatigued [SOB] : no shortness of breath [Dyspnea on exertion] : not dyspnea during exertion [Chest Discomfort] : no chest discomfort [Palpitations] : no palpitations [Syncope] : no syncope

## 2024-06-04 ENCOUNTER — APPOINTMENT (OUTPATIENT)
Dept: CARDIOLOGY | Facility: CLINIC | Age: 29
End: 2024-06-04

## 2024-06-05 ENCOUNTER — APPOINTMENT (OUTPATIENT)
Dept: HEART FAILURE | Facility: CLINIC | Age: 29
End: 2024-06-05
Payer: COMMERCIAL

## 2024-06-05 VITALS
HEIGHT: 68 IN | WEIGHT: 188 LBS | SYSTOLIC BLOOD PRESSURE: 128 MMHG | BODY MASS INDEX: 28.49 KG/M2 | OXYGEN SATURATION: 98 % | DIASTOLIC BLOOD PRESSURE: 70 MMHG | HEART RATE: 91 BPM

## 2024-06-05 DIAGNOSIS — I50.20 UNSPECIFIED SYSTOLIC (CONGESTIVE) HEART FAILURE: ICD-10-CM

## 2024-06-05 DIAGNOSIS — I47.29 OTHER VENTRICULAR TACHYCARDIA: ICD-10-CM

## 2024-06-05 PROBLEM — I48.0 PAROXYSMAL ATRIAL FIBRILLATION WITH RVR: Status: ACTIVE | Noted: 2023-09-03

## 2024-06-05 PROCEDURE — 99203 OFFICE O/P NEW LOW 30 MIN: CPT

## 2024-06-05 RX ORDER — SPIRONOLACTONE 25 MG/1
25 TABLET ORAL DAILY
Qty: 30 | Refills: 3 | Status: ACTIVE | COMMUNITY
Start: 2024-06-05 | End: 1900-01-01

## 2024-06-05 NOTE — HISTORY OF PRESENT ILLNESS
[FreeTextEntry1] : 28 year old male, non smoker, with a past medical history of thyroid cancer s/p resection on Synthroid, pAF CHADS-VASc 0 presents for follow up and now with acute HFref (lvef 40%)   Patient notes that he has been doing well with no chest pain or shortness of breath at rest or upon exertion. and tolerating GDMT with no dizziness or lightheadedness or syncope  Patient has since seen Dr. Asif electrophysiologist and divulged that  he has no family history of sudden death but states that uncle had low ejection fraction (does not have a device) when he was in his 30s. He is in his 40s now. Patient is currently cardiac stable but cMRI done shows LVEF of 40% with no evidence of scar n

## 2024-06-05 NOTE — CARDIOLOGY SUMMARY
[de-identified] : 1/2024: Sinus Rhythm  -Poor R-wave progression -nondiagnostic for this age.  [de-identified] : 9/3/2023 EF 55-60%   [de-identified] : CCTA: Normal coronary arteries.  cMRI: . Normal-sized left ventricle with global left ventricular wall hypokinesia and moderately depressed systolic function. Left ventricular ejection fraction of 40%.  2. Normal-sized right ventricle with mildly depressed systolic function. Right ventricular ejection fraction of 38%.  3.  No evidence of focal abnormal late gadolinium enhancement to suggest fibrous changes/scarring. [de-identified] : Genetic testing: no clinically significant variants detected

## 2024-06-05 NOTE — ASSESSMENT
[FreeTextEntry1] : 28 year old male, non smoker, with a past medical history of thyroid cancer s/p resection on Synthroid, pAF CHADS-VASc 0 presents for follow up and now with acute HFref (lvef 40%)   1) HFrEF LVEF 40%, NYHA Class I / ACC/AHA Stage B  - no acute chest pain or shortness of breath and appear euvolemic  - no EKG done this visit  - cMRI shows LVEF 40%, will need to assess if genetic predisposition - will obtain genetic testing, this was done and no clinically significant variants noted  - check Iron panel Rheumatoid LEVI and amyloid work up - normal  - will refer to Advanced heart failure for further work up  - on Entresto 24/26mg po BID and on Toprol XL 25mg po q daily   - disucssed with EP and no further work up from their standpoint   Shante Ritchie D.O. [facility] Cardiology/Vascular Cardiology -[facility] Telephone # [phone]

## 2024-06-07 ENCOUNTER — TRANSCRIPTION ENCOUNTER (OUTPATIENT)
Age: 29
End: 2024-06-07

## 2024-06-07 ENCOUNTER — OUTPATIENT (OUTPATIENT)
Dept: OUTPATIENT SERVICES | Facility: HOSPITAL | Age: 29
LOS: 1 days | End: 2024-06-07
Payer: COMMERCIAL

## 2024-06-07 VITALS
TEMPERATURE: 98 F | OXYGEN SATURATION: 100 % | DIASTOLIC BLOOD PRESSURE: 65 MMHG | HEART RATE: 79 BPM | RESPIRATION RATE: 16 BRPM | SYSTOLIC BLOOD PRESSURE: 119 MMHG

## 2024-06-07 DIAGNOSIS — Z98.890 OTHER SPECIFIED POSTPROCEDURAL STATES: Chronic | ICD-10-CM

## 2024-06-07 DIAGNOSIS — R55 SYNCOPE AND COLLAPSE: ICD-10-CM

## 2024-06-07 PROCEDURE — 33285 INSJ SUBQ CAR RHYTHM MNTR: CPT

## 2024-06-07 PROCEDURE — C1764: CPT

## 2024-06-07 RX ORDER — METOPROLOL TARTRATE 50 MG
1 TABLET ORAL
Refills: 0 | DISCHARGE

## 2024-06-07 RX ORDER — CEPHALEXIN 500 MG
500 CAPSULE ORAL ONCE
Refills: 0 | Status: COMPLETED | OUTPATIENT
Start: 2024-06-07 | End: 2024-06-07

## 2024-06-07 RX ORDER — SPIRONOLACTONE 25 MG/1
1 TABLET, FILM COATED ORAL
Refills: 0 | DISCHARGE

## 2024-06-07 RX ORDER — LEVOTHYROXINE SODIUM 125 MCG
1 TABLET ORAL
Refills: 0 | DISCHARGE

## 2024-06-07 RX ORDER — SACUBITRIL AND VALSARTAN 24; 26 MG/1; MG/1
1 TABLET, FILM COATED ORAL
Refills: 0 | DISCHARGE

## 2024-06-07 RX ADMIN — Medication 500 MILLIGRAM(S): at 07:53

## 2024-06-07 NOTE — DISCHARGE NOTE NURSING/CASE MANAGEMENT/SOCIAL WORK - NSTRANSFERBELONGINGSDISPO_GEN_A_NUR
Gestational Age  34.1 (14 Jan 2019 19:12)            Current Age:  2d        Corrected Gestational Age:    ADMISSION DIAGNOSIS:  Prematurity      INTERVAL HISTORY: Last 24 hours significant for started on phototherapy, feeds increased      VITAL SIGNS:  ICU Vital Signs Last 24 Hrs  T(C): 36.9 (16 Jan 2019 10:00), Max: 37.2 (15 Eugene 2019 16:00)  T(F): 98.4 (16 Jan 2019 10:00), Max: 98.9 (15 Eugene 2019 16:00)  HR: 149 (16 Jan 2019 10:00) (126 - 149)  BP: 65/42 (16 Jan 2019 10:00) (61/35 - 65/42)  BP(mean): 51 (16 Jan 2019 10:00) (44 - 51)  ABP: --  ABP(mean): --  RR: 39 (16 Jan 2019 10:00) (32 - 55)  SpO2: 99% (16 Jan 2019 10:00) (99% - 100%)      CAPILLARY BLOOD GLUCOSE  POCT Blood Glucose.: 92 mg/dL (16 Jan 2019 05:26)  POCT Blood Glucose.: 77 mg/dL (15 Eugene 2019 19:17)      PHYSICAL EXAM:  General: Awake and active; in no acute distress  Head: AFOF  Eyes: Red reflex present bilaterally  Ears: Patent bilaterally, no deformities  Nose: Nares patent  Neck: No masses, intact clavicles  Chest: Breath sounds equal to auscultation. No retractions  CV: No murmurs appreciated, normal pulses distally  Abdomen: Soft nontender nondistended, no masses, bowel sounds present  : Normal for gestational age  Spine: Intact, no sacral dimples or tags  Anus: Grossly patent  Extremities: FROM, no hip clicks  Skin: pink, no lesions      INFECTIOUS DISEASE:  No signs and symptoms of infection  Culture - Blood (01.14.19 @ 22:49)    Specimen Source: .Blood Blood-Peripheral    Culture Results:   No growth at 1 day.      HEMATOLOGY:  Bilirubin - Total and Direct in AM (01.16.19 @ 06:14)    Indirect Reacting Bilirubin: 8.8 mg/dL    Bilirubin Direct, Serum: 0.3 mg/dL    Bilirubin Total, Serum: 9.1 mg/dL      METABOLIC:  Total Fluid Goal:   ~120 mL/kG/day  I&O's Detail    IN: 20 mL Q3 [id number]/[id number]   D10 with calcium @ 4 mL/hr (PIV)    OUT: Voiding and stooling      DISCHARGE PLANNING: in progress Gestational Age  34.1 (14 Jan 2019 19:12)            Current Age:  2d        Corrected Gestational Age:    ADMISSION DIAGNOSIS:  Prematurity      INTERVAL HISTORY: Last 24 hours significant for started on phototherapy, feeds increased nippling all      VITAL SIGNS:  ICU Vital Signs Last 24 Hrs  T(C): 36.9 (16 Jan 2019 10:00), Max: 37.2 (15 Eugene 2019 16:00)  T(F): 98.4 (16 Jan 2019 10:00), Max: 98.9 (15 Eugene 2019 16:00)  HR: 149 (16 Jan 2019 10:00) (126 - 149)  BP: 65/42 (16 Jan 2019 10:00) (61/35 - 65/42)  BP(mean): 51 (16 Jan 2019 10:00) (44 - 51)  ABP: --  ABP(mean): --  RR: 39 (16 Jan 2019 10:00) (32 - 55)  SpO2: 99% (16 Jan 2019 10:00) (99% - 100%)      CAPILLARY BLOOD GLUCOSE  POCT Blood Glucose.: 92 mg/dL (16 Jan 2019 05:26)  POCT Blood Glucose.: 77 mg/dL (15 Eugene 2019 19:17)      PHYSICAL EXAM:  General: Awake and active; in no acute distress  Head: AFOF  Eyes: Red reflex present bilaterally  Ears: Patent bilaterally, no deformities  Nose: Nares patent  Neck: No masses, intact clavicles  Chest: Breath sounds equal to auscultation. No retractions  CV: No murmurs appreciated, normal pulses distally  Abdomen: Soft nontender nondistended, no masses, bowel sounds present  : Normal for gestational age  Spine: Intact, no sacral dimples or tags  Anus: Grossly patent  Extremities: FROM, no hip clicks  Skin: pink, no lesions      INFECTIOUS DISEASE:  No signs and symptoms of infection  Culture - Blood (01.14.19 @ 22:49)    Specimen Source: .Blood Blood-Peripheral    Culture Results:   No growth at 1 day.      HEMATOLOGY:  Bilirubin - Total and Direct in AM (01.16.19 @ 06:14)    Indirect Reacting Bilirubin: 8.8 mg/dL    Bilirubin Direct, Serum: 0.3 mg/dL    Bilirubin Total, Serum: 9.1 mg/dL      METABOLIC:  Total Fluid Goal:   ~120 mL/kG/day  I&O's Detail    IN: 20 mL Q3 [id number]/[id number]   D10 with calcium @ 4 mL/hr (PIV)    OUT: Voiding and stooling      DISCHARGE PLANNING: in progress with patient

## 2024-06-07 NOTE — DISCHARGE NOTE NURSING/CASE MANAGEMENT/SOCIAL WORK - PATIENT PORTAL LINK FT
You can access the FollowMyHealth Patient Portal offered by [facility] by registering at the following website: [email or web address]. By joining [facility]’s FollowMyHealth portal, you will also be able to view your health information using other applications (apps) compatible with our system.

## 2024-06-07 NOTE — ASU DISCHARGE PLAN (ADULT/PEDIATRIC) - ASU DC SPECIAL INSTRUCTIONSFT
Loop Recorder Incision Care:     - Do not touch the incision until it is completely healed.   - Keep the incision dry for 24 hours.   - There is Dermabond (skin glue) on your incision, which will start to flake off on its own over the next 2-3 weeks. Do not pick at or peel off the Dermabond.   - Do not apply soaps, creams, lotions, ointments or powders to the incision until it is completely healed.  - You should call the doctor if you notice redness, drainage, swelling, increased tenderness, hot sensation around the incision, bleeding or incision edges pulling apart.

## 2024-06-07 NOTE — PROGRESS NOTE ADULT - SUBJECTIVE AND OBJECTIVE BOX
Procedure: MDT Loop Recorder implant    Electrophysiologist: Dr Asif    Pt doing well s/p loop recorder implant. Denies complaint.     Incision: Dermabond C/D/I; no bleeding, hematoma, erythema, exudate or edema    Plan:   Resume PO intake.   Ambulate w/ assist, then progress as tolerated.     Pain control with PO analgesia PRN.   Resume home medications.   D/C home once all criteria met, with outpt f/up in 1-2 weeks.   
28-year-old M thyroid ca s/p resection on Synthroid, Obesity (BMI 30-39.9), KAMINI, HFrEF (40%), NSVT,  Paroxysmal atrial fibrillation with RVR, and syncopal episode.  On 9/2023 presented to the ER after a syncopal episode. He had eaten very little that day and had been binge drinking. When EMS arrived, he was noted to be in atrial fibrillation with RVR which self-converted to sinus rhythm spontaneously. TTE revealed preserved LVEF. On outpatient monitor no AF was seen but he had one episode of NSVT which was 8 beats long. A CCTA was performed on 10/30/23 which revealed normal coronaries. He was started on metoprolol 25 mg daily. Cardiac MRI revealed EF 40%.  Pt denies any further syncopal episodes. He has no family history of sudden death but states that uncle had low ejection fraction (does not have a device) when he was in his 30s. He is in his 40s now.  Due to the pt's history of syncope, now with documented LV dysfunction on cardiac MRI (no LGE), and NSVT on external monitoring. GDMT initiated by Dr. Ritchie. The pt is now presents electively for ILR for long term arrhythmia surveillance of both atrial and ventricular arrhythmias.      Cardiology summary:    Cardiac MRI revealed EF 40%, No lge. Normal RV size with mildly depressed systolic function. RV EF 38%.  ?  Saw Dr. Ritchie, medical therapy initiated 3/5/24. Genetic testing ordered. No clinically significant variants detected.  ?  Today, Mr. Funez reports he has been feeling well since last follow up. Denies palpitations, dizziness, near syncope, syncope. ECG reveals SR.      Plan  -Consent with attending  -Keflex 500mg PO x1

## 2024-06-07 NOTE — ASU DISCHARGE PLAN (ADULT/PEDIATRIC) - CARE PROVIDER_API CALL
[facility]  Cardiac Electrophysiology  [address], [address]  [place] [address]  Phone: [phone]  Fax: [phone]  Follow Up Time:

## 2024-06-10 PROBLEM — I47.29 NSVT (NONSUSTAINED VENTRICULAR TACHYCARDIA): Status: ACTIVE | Noted: 2023-10-16

## 2024-06-10 PROBLEM — I50.20 HFREF (HEART FAILURE WITH REDUCED EJECTION FRACTION): Status: ACTIVE | Noted: 2024-03-05

## 2024-06-10 NOTE — HISTORY OF PRESENT ILLNESS
[FreeTextEntry1] : Patient is a 28 year old male with history of thyroid nodule s/p resection, NICM (40%) and history of Afib who comes in to establish care with heart failure.  In September of 2023, the patient presented with syncope. He states it was in the setting of not eating much and dieting so he thought it was dehydration. When EMS arrived he was found to be in Afib with RVR which converted back to sinus on its own. At that time his echo was 55-60%. A CCTA was done which showed normal coronaries. Given that his CHADsvasc was 0, he was monitored with an MCOT and followed closely. MCOT did not reveal anymore Afib but did show an episode of NSVT for which he was started on a BB. A cardiac MRI was performed which showed an EF of 40% without any LGE. In addition to Toprol he was now started on entresto. He has no functional limitations and goes to the gym often. He denies anymore symptoms of lightheadedness or dizziness.  Of note his uncle has cardiomyopathy and had even seen Dr. Chakraborty for genetic testing given that he is only in his 40s.

## 2024-06-10 NOTE — PHYSICAL EXAM
[Well Developed] : well developed [Well Nourished] : well nourished [Normal Conjunctiva] : normal conjunctiva [Normal Venous Pressure] : normal venous pressure [Normal S1, S2] : normal S1, S2 [No Murmur] : no murmur [Clear Lung Fields] : clear lung fields [Soft] : abdomen soft [Non Tender] : non-tender [Normal Gait] : normal gait [No Edema] : no edema [No Cyanosis] : no cyanosis [No Rash] : no rash

## 2024-06-10 NOTE — CARDIOLOGY SUMMARY
[de-identified] : May 2024: DIAN [de-identified] : September 2023: LVEF 55-60%, normal RV function [de-identified] : Cardiac MRI: LVEF 40%, normal RV size with mildly depressed RV function, no LGE Ct Angio (Oct 2023): Normal coronaries

## 2024-06-10 NOTE — ASSESSMENT
[FreeTextEntry1] : Patient is a 28 year old male with history of thyroid nodule s/p resection, NICM (40%) and history of Afib who comes in to establish care with heart failure.  # Chronic systolic heart failure: Patient is warm and well perfused on exam. He is quite active and denies any symptoms. He does participate in the gym. Given the history of his undle who has cardiomyopathy at 40 and his young age will get genetic testing. He did have a limited panel performed in the office which was negative but will have a full panel performed with Dr. Chakraborty -GDMT:  c/w Toprol 25mg daily and entresto 24/24mg BID. Start spironolactone 25mg daily. Will add an SGLT2i in the future -Diuretics: none needed -Labs:   2 weeks after starting re -Device: none required at this time -HF lifestyle modifications including daily weights, BP log, and low sodium diet reiterated. Pt given daily weight and BP log and verbalized understanding to notify office if >3lb/1 day or >5 lb/1 week weight gain  #NSVT - BB as above - Followed by EP

## 2024-06-27 ENCOUNTER — LABORATORY RESULT (OUTPATIENT)
Age: 29
End: 2024-06-27

## 2024-06-27 LAB
T4 FREE SERPL-MCNC: 2 NG/DL
TSH SERPL-ACNC: 1.21 UIU/ML

## 2024-06-28 ENCOUNTER — APPOINTMENT (OUTPATIENT)
Dept: CARDIOLOGY | Facility: CLINIC | Age: 29
End: 2024-06-28
Payer: COMMERCIAL

## 2024-06-28 ENCOUNTER — NON-APPOINTMENT (OUTPATIENT)
Age: 29
End: 2024-06-28

## 2024-06-28 VITALS
HEART RATE: 84 BPM | HEIGHT: 68 IN | DIASTOLIC BLOOD PRESSURE: 60 MMHG | BODY MASS INDEX: 27.74 KG/M2 | WEIGHT: 183 LBS | SYSTOLIC BLOOD PRESSURE: 110 MMHG | OXYGEN SATURATION: 98 %

## 2024-06-28 LAB
ALBUMIN SERPL ELPH-MCNC: 4.6 G/DL
ALP BLD-CCNC: 60 U/L
ALT SERPL-CCNC: 25 U/L
ANION GAP SERPL CALC-SCNC: 12 MMOL/L
AST SERPL-CCNC: 25 U/L
BILIRUB SERPL-MCNC: 0.6 MG/DL
BUN SERPL-MCNC: 14 MG/DL
CALCIUM SERPL-MCNC: 9.6 MG/DL
CHLORIDE SERPL-SCNC: 100 MMOL/L
CO2 SERPL-SCNC: 24 MMOL/L
CREAT SERPL-MCNC: 1.09 MG/DL
EGFR: 95 ML/MIN/1.73M2
GLUCOSE SERPL-MCNC: 94 MG/DL
POTASSIUM SERPL-SCNC: 4.7 MMOL/L
PROT SERPL-MCNC: 7.2 G/DL
SODIUM SERPL-SCNC: 136 MMOL/L

## 2024-06-28 PROCEDURE — 99214 OFFICE O/P EST MOD 30 MIN: CPT | Mod: 25

## 2024-06-28 PROCEDURE — 93000 ELECTROCARDIOGRAM COMPLETE: CPT | Mod: 59

## 2024-06-30 LAB
THYROGLOB AB SERPL-ACNC: <20 IU/ML
THYROGLOB SERPL-MCNC: 0.81 NG/ML

## 2024-07-02 ENCOUNTER — RX RENEWAL (OUTPATIENT)
Age: 29
End: 2024-07-02

## 2024-07-09 ENCOUNTER — APPOINTMENT (OUTPATIENT)
Dept: HEART FAILURE | Facility: CLINIC | Age: 29
End: 2024-07-09
Payer: COMMERCIAL

## 2024-07-09 VITALS
BODY MASS INDEX: 27.28 KG/M2 | DIASTOLIC BLOOD PRESSURE: 70 MMHG | OXYGEN SATURATION: 98 % | WEIGHT: 180 LBS | HEART RATE: 79 BPM | HEIGHT: 68 IN | SYSTOLIC BLOOD PRESSURE: 110 MMHG

## 2024-07-09 DIAGNOSIS — I48.0 PAROXYSMAL ATRIAL FIBRILLATION: ICD-10-CM

## 2024-07-09 DIAGNOSIS — G47.33 OBSTRUCTIVE SLEEP APNEA (ADULT) (PEDIATRIC): ICD-10-CM

## 2024-07-09 DIAGNOSIS — E89.0 POSTPROCEDURAL HYPOTHYROIDISM: ICD-10-CM

## 2024-07-09 DIAGNOSIS — I50.20 UNSPECIFIED SYSTOLIC (CONGESTIVE) HEART FAILURE: ICD-10-CM

## 2024-07-09 DIAGNOSIS — I47.29 OTHER VENTRICULAR TACHYCARDIA: ICD-10-CM

## 2024-07-09 DIAGNOSIS — Z85.850 PERSONAL HISTORY OF MALIGNANT NEOPLASM OF THYROID: ICD-10-CM

## 2024-07-09 DIAGNOSIS — E03.9 HYPOTHYROIDISM, UNSPECIFIED: ICD-10-CM

## 2024-07-09 PROCEDURE — 99214 OFFICE O/P EST MOD 30 MIN: CPT

## 2024-07-31 ENCOUNTER — TRANSCRIPTION ENCOUNTER (OUTPATIENT)
Age: 29
End: 2024-07-31

## 2024-08-15 ENCOUNTER — APPOINTMENT (OUTPATIENT)
Dept: ELECTROPHYSIOLOGY | Facility: CLINIC | Age: 29
End: 2024-08-15

## 2024-08-15 PROCEDURE — 93298 REM INTERROG DEV EVAL SCRMS: CPT

## 2024-08-16 ENCOUNTER — APPOINTMENT (OUTPATIENT)
Dept: ENDOCRINOLOGY | Facility: CLINIC | Age: 29
End: 2024-08-16
Payer: COMMERCIAL

## 2024-08-16 VITALS
HEIGHT: 68 IN | WEIGHT: 184 LBS | SYSTOLIC BLOOD PRESSURE: 98 MMHG | BODY MASS INDEX: 27.89 KG/M2 | OXYGEN SATURATION: 98 % | DIASTOLIC BLOOD PRESSURE: 64 MMHG | HEART RATE: 78 BPM

## 2024-08-16 DIAGNOSIS — C73 MALIGNANT NEOPLASM OF THYROID GLAND: ICD-10-CM

## 2024-08-16 DIAGNOSIS — E89.0 POSTPROCEDURAL HYPOTHYROIDISM: ICD-10-CM

## 2024-08-16 PROCEDURE — 99214 OFFICE O/P EST MOD 30 MIN: CPT

## 2024-08-16 NOTE — PHYSICAL EXAM
[Alert] : alert [No Acute Distress] : no acute distress [EOMI] : extra ocular movement intact [No LAD] : no lymphadenopathy [Well Healed Scar] : well healed scar [No Respiratory Distress] : no respiratory distress [Clear to Auscultation] : lungs were clear to auscultation bilaterally [Normal S1, S2] : normal S1 and S2 [Normal Rate] : heart rate was normal [No Tremors] : no tremors [Oriented x3] : oriented to person, place, and time [Normal Affect] : the affect was normal [Normal Insight/Judgement] : insight and judgment were intact [Normal Mood] : the mood was normal

## 2024-08-16 NOTE — HISTORY OF PRESENT ILLNESS
[FreeTextEntry1] : Interval hx - dx with HFrEF, Class 1 following with cardio  In 2019 he was diagnosed with thyroid cancer. At first underwent hemithyroidectomy and then underwent completion thyroidectomy later that year at [facility] with Dr Edwards.  No post-surgical complications.  He did not need COLIN.  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 Tumor size 2.5 cm. Stage T2, Nx  5/2019 Left thyroid lobectomy: adenomatous hyperplasia, parathyroid tissue, no tumor  5/2019 Ca 9.7, iPTH 38 7/2019 Ca 9.4, iPTH 58  10/2019 neck sono: s/p total thyroidectomy, no adenopathy 3/2021: neck sono: s/p total thyroidectomy, no adenopathy 9/2021:neck sono: s/p total thyroidectomy, no adenopathy 7/2022 neck sono - s/p total thyroidectomy, no adenopathy 10/2023 neck sono - s/p total thyroidectomy, benign lymph node  He has been on varying doses of  thyroid hormone since 2019, He takes his thyroid medication daily on empty stomach. No family thyroid cancer, sister with hashimoto's thyroid disease  Current thyroid meds: Synthroid 175 mcg daily (PATTY)

## 2024-08-21 ENCOUNTER — APPOINTMENT (OUTPATIENT)
Dept: CARDIOLOGY | Facility: CLINIC | Age: 29
End: 2024-08-21

## 2024-09-04 ENCOUNTER — APPOINTMENT (OUTPATIENT)
Dept: CARDIOLOGY | Facility: CLINIC | Age: 29
End: 2024-09-04
Payer: COMMERCIAL

## 2024-09-04 PROCEDURE — 93306 TTE W/DOPPLER COMPLETE: CPT

## 2024-09-18 ENCOUNTER — NON-APPOINTMENT (OUTPATIENT)
Age: 29
End: 2024-09-18

## 2024-09-19 ENCOUNTER — RX RENEWAL (OUTPATIENT)
Age: 29
End: 2024-09-19

## 2024-09-19 ENCOUNTER — APPOINTMENT (OUTPATIENT)
Dept: ELECTROPHYSIOLOGY | Facility: CLINIC | Age: 29
End: 2024-09-19
Payer: COMMERCIAL

## 2024-09-19 PROCEDURE — 93298 REM INTERROG DEV EVAL SCRMS: CPT

## 2024-09-30 ENCOUNTER — APPOINTMENT (OUTPATIENT)
Dept: CARDIOLOGY | Facility: CLINIC | Age: 29
End: 2024-09-30
Payer: COMMERCIAL

## 2024-09-30 VITALS
WEIGHT: 181 LBS | HEART RATE: 82 BPM | OXYGEN SATURATION: 98 % | DIASTOLIC BLOOD PRESSURE: 66 MMHG | BODY MASS INDEX: 27.43 KG/M2 | HEIGHT: 68 IN | SYSTOLIC BLOOD PRESSURE: 106 MMHG

## 2024-09-30 DIAGNOSIS — I50.20 UNSPECIFIED SYSTOLIC (CONGESTIVE) HEART FAILURE: ICD-10-CM

## 2024-09-30 PROCEDURE — 93000 ELECTROCARDIOGRAM COMPLETE: CPT

## 2024-09-30 PROCEDURE — 99214 OFFICE O/P EST MOD 30 MIN: CPT | Mod: 25

## 2024-10-09 ENCOUNTER — NON-APPOINTMENT (OUTPATIENT)
Age: 29
End: 2024-10-09

## 2024-10-09 ENCOUNTER — APPOINTMENT (OUTPATIENT)
Dept: HEART FAILURE | Facility: CLINIC | Age: 29
End: 2024-10-09
Payer: COMMERCIAL

## 2024-10-09 VITALS
HEART RATE: 83 BPM | DIASTOLIC BLOOD PRESSURE: 78 MMHG | HEIGHT: 68 IN | OXYGEN SATURATION: 97 % | BODY MASS INDEX: 27.28 KG/M2 | WEIGHT: 180 LBS | SYSTOLIC BLOOD PRESSURE: 100 MMHG

## 2024-10-09 DIAGNOSIS — I48.0 PAROXYSMAL ATRIAL FIBRILLATION: ICD-10-CM

## 2024-10-09 DIAGNOSIS — I47.29 OTHER VENTRICULAR TACHYCARDIA: ICD-10-CM

## 2024-10-09 DIAGNOSIS — I42.8 OTHER CARDIOMYOPATHIES: ICD-10-CM

## 2024-10-09 PROCEDURE — 99214 OFFICE O/P EST MOD 30 MIN: CPT

## 2024-10-09 NOTE — ASSESSMENT
[FreeTextEntry1] : 29 year old male, non smoker, with a past medical history of thyroid cancer s/p resection on Synthroid, pAF CHADS-VASc 0 presents for follow up and now with acute HFref (lvef 40%)   1) HFrecEF LVEF 40%, NYHA Class I / ACC/AHA Stage B, recovered to 50-55% (based on TTE done on 9/4/2024) - no acute chest pain or shortness of breath and appear euvolemic  - EKG done shows Sinus Rhythm @ 77 bpm  WITHIN NORMAL LIMITS - cMRI shows LVEF 40%, will need to assess if genetic predisposition - will obtain genetic testing, this was done and no clinically significant variants noted  - on Entresto 24/26mg po BID and on Toprol XL 25mg po q daily, Spironolactone 25mg po q daily   - TTE done on 9/4/2024, reviewed with patient and that LVEF improved to 50-55%   will follow up   Shante Ritchie D.O. [facility] Cardiology/Vascular Cardiology -[facility] Cardiology Telephone # [phone]

## 2024-10-09 NOTE — HISTORY OF PRESENT ILLNESS
[FreeTextEntry1] : 28 year old male, non smoker, with a past medical history of thyroid cancer s/p resection on Synthroid, pAF CHADS-VASc 0 presents for follow up and now with acute HFref (lvef 40%), recovered LVEF 50-55% presents for follow up    Patient notes that he has been doing well with no chest pain or shortness of breath at rest or upon exertion. and tolerating GDMT with no dizziness or lightheadedness or syncope  Patient has since seen Dr. Asif electrophysiologist and divulged that  he has no family history of sudden death but states that uncle had low ejection fraction (does not have a device) when he was in his 30s. He is in his 40s now. Patient is currently cardiac stable but cMRI done shows LVEF of 40% with no evidence of scar  and had repeat TTE done which shows interval improvement in LVEF

## 2024-10-09 NOTE — ASSESSMENT
[FreeTextEntry1] : 29 year old male, non smoker, with a past medical history of thyroid cancer s/p resection on Synthroid, pAF CHADS-VASc 0 presents for follow up and now with acute HFref (lvef 40%)   1) HFrecEF LVEF 40%, NYHA Class I / ACC/AHA Stage B, recovered to 50-55% (based on TTE done on 9/4/2024) - no acute chest pain or shortness of breath and appear euvolemic  - EKG done shows Sinus Rhythm @ 77 bpm  WITHIN NORMAL LIMITS - cMRI shows LVEF 40%, will need to assess if genetic predisposition - will obtain genetic testing, this was done and no clinically significant variants noted  - on Entresto 24/26mg po BID and on Toprol XL 25mg po q daily, Spironolactone 25mg po q daily   - TTE done on 9/4/2024, reviewed with patient and that LVEF improved to 50-55%   will follow up   Shante Ritchie D.O. [facility] Cardiology/Vascular Cardiology -[facility] Cardiology Telephone # [phone]

## 2024-10-09 NOTE — CARDIOLOGY SUMMARY
[de-identified] : 9/30/2024: Sinus Rhythm @ 77 bpm  WITHIN NORMAL LIMITS 1/2024: Sinus Rhythm  -Poor R-wave progression -nondiagnostic for this age.  [de-identified] : 9/4/2024: Left ventricular systolic function is normal with an ejection fraction of 53 % by Vargas's method of disks 51 % by 3D with an ejection fraction visually estimated at 50 to 55 %. 2. Normal left ventricular diastolic function. 3. Normal right ventricular cavity size and normal right ventricular systolic function. 4. No pericardial effusion seen. 5. Compared to systolic function (40%) on cardiac MRI dated 2/6/24, systolic function is now 50-55%. 9/3/2023 EF 55-60%   [de-identified] : CCTA: Normal coronary arteries.  cMRI: . Normal-sized left ventricle with global left ventricular wall hypokinesia and moderately depressed systolic function. Left ventricular ejection fraction of 40%.  2. Normal-sized right ventricle with mildly depressed systolic function. Right ventricular ejection fraction of 38%.  3.  No evidence of focal abnormal late gadolinium enhancement to suggest fibrous changes/scarring. [de-identified] : Genetic testing: no clinically significant variants detected

## 2024-10-09 NOTE — CARDIOLOGY SUMMARY
[de-identified] : 9/30/2024: Sinus Rhythm @ 77 bpm  WITHIN NORMAL LIMITS 1/2024: Sinus Rhythm  -Poor R-wave progression -nondiagnostic for this age.  [de-identified] : 9/4/2024: Left ventricular systolic function is normal with an ejection fraction of 53 % by Vargas's method of disks 51 % by 3D with an ejection fraction visually estimated at 50 to 55 %. 2. Normal left ventricular diastolic function. 3. Normal right ventricular cavity size and normal right ventricular systolic function. 4. No pericardial effusion seen. 5. Compared to systolic function (40%) on cardiac MRI dated 2/6/24, systolic function is now 50-55%. 9/3/2023 EF 55-60%   [de-identified] : CCTA: Normal coronary arteries.  cMRI: . Normal-sized left ventricle with global left ventricular wall hypokinesia and moderately depressed systolic function. Left ventricular ejection fraction of 40%.  2. Normal-sized right ventricle with mildly depressed systolic function. Right ventricular ejection fraction of 38%.  3.  No evidence of focal abnormal late gadolinium enhancement to suggest fibrous changes/scarring. [de-identified] : Genetic testing: no clinically significant variants detected

## 2024-10-24 ENCOUNTER — NON-APPOINTMENT (OUTPATIENT)
Age: 29
End: 2024-10-24

## 2024-10-24 ENCOUNTER — APPOINTMENT (OUTPATIENT)
Dept: ELECTROPHYSIOLOGY | Facility: CLINIC | Age: 29
End: 2024-10-24
Payer: COMMERCIAL

## 2024-10-24 PROCEDURE — 93298 REM INTERROG DEV EVAL SCRMS: CPT

## 2024-11-11 ENCOUNTER — LABORATORY RESULT (OUTPATIENT)
Age: 29
End: 2024-11-11

## 2024-11-29 ENCOUNTER — APPOINTMENT (OUTPATIENT)
Dept: ELECTROPHYSIOLOGY | Facility: CLINIC | Age: 29
End: 2024-11-29
Payer: COMMERCIAL

## 2024-11-29 ENCOUNTER — NON-APPOINTMENT (OUTPATIENT)
Age: 29
End: 2024-11-29

## 2024-11-29 PROCEDURE — 93298 REM INTERROG DEV EVAL SCRMS: CPT

## 2024-12-13 ENCOUNTER — NON-APPOINTMENT (OUTPATIENT)
Age: 29
End: 2024-12-13

## 2024-12-13 ENCOUNTER — APPOINTMENT (OUTPATIENT)
Dept: ENDOCRINOLOGY | Facility: CLINIC | Age: 29
End: 2024-12-13
Payer: COMMERCIAL

## 2024-12-13 VITALS
DIASTOLIC BLOOD PRESSURE: 62 MMHG | BODY MASS INDEX: 27.43 KG/M2 | HEART RATE: 61 BPM | WEIGHT: 181 LBS | HEIGHT: 68 IN | OXYGEN SATURATION: 98 % | SYSTOLIC BLOOD PRESSURE: 98 MMHG

## 2024-12-13 DIAGNOSIS — E89.0 POSTPROCEDURAL HYPOTHYROIDISM: ICD-10-CM

## 2024-12-13 DIAGNOSIS — C73 MALIGNANT NEOPLASM OF THYROID GLAND: ICD-10-CM

## 2024-12-13 PROCEDURE — 99214 OFFICE O/P EST MOD 30 MIN: CPT

## 2024-12-20 ENCOUNTER — APPOINTMENT (OUTPATIENT)
Dept: ELECTROPHYSIOLOGY | Facility: CLINIC | Age: 29
End: 2024-12-20

## 2024-12-23 ENCOUNTER — RX RENEWAL (OUTPATIENT)
Age: 29
End: 2024-12-23

## 2024-12-27 ENCOUNTER — RX RENEWAL (OUTPATIENT)
Age: 29
End: 2024-12-27

## 2024-12-30 ENCOUNTER — RX RENEWAL (OUTPATIENT)
Age: 29
End: 2024-12-30

## 2025-01-03 ENCOUNTER — NON-APPOINTMENT (OUTPATIENT)
Age: 30
End: 2025-01-03

## 2025-01-03 ENCOUNTER — APPOINTMENT (OUTPATIENT)
Dept: ELECTROPHYSIOLOGY | Facility: CLINIC | Age: 30
End: 2025-01-03
Payer: COMMERCIAL

## 2025-01-03 PROCEDURE — 93298 REM INTERROG DEV EVAL SCRMS: CPT

## 2025-02-03 ENCOUNTER — NON-APPOINTMENT (OUTPATIENT)
Age: 30
End: 2025-02-03

## 2025-02-07 ENCOUNTER — APPOINTMENT (OUTPATIENT)
Dept: ELECTROPHYSIOLOGY | Facility: CLINIC | Age: 30
End: 2025-02-07

## 2025-02-07 PROCEDURE — 93298 REM INTERROG DEV EVAL SCRMS: CPT

## 2025-03-14 ENCOUNTER — APPOINTMENT (OUTPATIENT)
Dept: ELECTROPHYSIOLOGY | Facility: CLINIC | Age: 30
End: 2025-03-14
Payer: COMMERCIAL

## 2025-03-14 ENCOUNTER — NON-APPOINTMENT (OUTPATIENT)
Age: 30
End: 2025-03-14

## 2025-03-14 PROCEDURE — 93298 REM INTERROG DEV EVAL SCRMS: CPT

## 2025-03-28 ENCOUNTER — APPOINTMENT (OUTPATIENT)
Dept: CARDIOLOGY | Facility: CLINIC | Age: 30
End: 2025-03-28

## 2025-03-28 VITALS
HEART RATE: 66 BPM | BODY MASS INDEX: 28.64 KG/M2 | SYSTOLIC BLOOD PRESSURE: 105 MMHG | DIASTOLIC BLOOD PRESSURE: 58 MMHG | WEIGHT: 189 LBS | HEIGHT: 68 IN | OXYGEN SATURATION: 97 %

## 2025-03-28 PROCEDURE — 99214 OFFICE O/P EST MOD 30 MIN: CPT | Mod: 25

## 2025-03-28 PROCEDURE — 93000 ELECTROCARDIOGRAM COMPLETE: CPT

## 2025-04-04 ENCOUNTER — RX RENEWAL (OUTPATIENT)
Age: 30
End: 2025-04-04

## 2025-04-18 ENCOUNTER — APPOINTMENT (OUTPATIENT)
Dept: ELECTROPHYSIOLOGY | Facility: CLINIC | Age: 30
End: 2025-04-18

## 2025-04-18 PROCEDURE — 93298 REM INTERROG DEV EVAL SCRMS: CPT

## 2025-04-28 ENCOUNTER — NON-APPOINTMENT (OUTPATIENT)
Age: 30
End: 2025-04-28

## 2025-05-17 ENCOUNTER — LABORATORY RESULT (OUTPATIENT)
Age: 30
End: 2025-05-17

## 2025-05-23 ENCOUNTER — NON-APPOINTMENT (OUTPATIENT)
Age: 30
End: 2025-05-23

## 2025-05-23 ENCOUNTER — APPOINTMENT (OUTPATIENT)
Dept: ELECTROPHYSIOLOGY | Facility: CLINIC | Age: 30
End: 2025-05-23
Payer: COMMERCIAL

## 2025-05-23 PROCEDURE — 93298 REM INTERROG DEV EVAL SCRMS: CPT

## 2025-06-03 ENCOUNTER — APPOINTMENT (OUTPATIENT)
Dept: ENDOCRINOLOGY | Facility: CLINIC | Age: 30
End: 2025-06-03
Payer: COMMERCIAL

## 2025-06-03 VITALS
SYSTOLIC BLOOD PRESSURE: 118 MMHG | HEIGHT: 68 IN | BODY MASS INDEX: 28.79 KG/M2 | WEIGHT: 190 LBS | HEART RATE: 81 BPM | DIASTOLIC BLOOD PRESSURE: 72 MMHG | OXYGEN SATURATION: 97 %

## 2025-06-03 DIAGNOSIS — C73 MALIGNANT NEOPLASM OF THYROID GLAND: ICD-10-CM

## 2025-06-03 DIAGNOSIS — E89.0 POSTPROCEDURAL HYPOTHYROIDISM: ICD-10-CM

## 2025-06-03 PROCEDURE — 99214 OFFICE O/P EST MOD 30 MIN: CPT

## 2025-06-03 PROCEDURE — G2211 COMPLEX E/M VISIT ADD ON: CPT | Mod: NC

## 2025-06-17 ENCOUNTER — APPOINTMENT (OUTPATIENT)
Dept: HEART FAILURE | Facility: CLINIC | Age: 30
End: 2025-06-17
Payer: COMMERCIAL

## 2025-06-17 VITALS
WEIGHT: 192 LBS | BODY MASS INDEX: 29.1 KG/M2 | HEART RATE: 79 BPM | HEIGHT: 68 IN | DIASTOLIC BLOOD PRESSURE: 62 MMHG | SYSTOLIC BLOOD PRESSURE: 102 MMHG | OXYGEN SATURATION: 95 %

## 2025-06-17 PROBLEM — I50.32 HEART FAILURE WITH IMPROVED EJECTION FRACTION (HFIMPEF): Status: ACTIVE | Noted: 2025-06-17

## 2025-06-17 PROCEDURE — 99204 OFFICE O/P NEW MOD 45 MIN: CPT

## 2025-06-27 ENCOUNTER — NON-APPOINTMENT (OUTPATIENT)
Age: 30
End: 2025-06-27

## 2025-06-27 ENCOUNTER — APPOINTMENT (OUTPATIENT)
Dept: ELECTROPHYSIOLOGY | Facility: CLINIC | Age: 30
End: 2025-06-27
Payer: COMMERCIAL

## 2025-06-27 PROCEDURE — 93298 REM INTERROG DEV EVAL SCRMS: CPT

## 2025-08-01 ENCOUNTER — APPOINTMENT (OUTPATIENT)
Dept: ELECTROPHYSIOLOGY | Facility: CLINIC | Age: 30
End: 2025-08-01
Payer: COMMERCIAL

## 2025-08-01 ENCOUNTER — NON-APPOINTMENT (OUTPATIENT)
Age: 30
End: 2025-08-01

## 2025-08-01 PROCEDURE — 93298 REM INTERROG DEV EVAL SCRMS: CPT

## 2025-09-05 ENCOUNTER — APPOINTMENT (OUTPATIENT)
Dept: CARDIOLOGY | Facility: CLINIC | Age: 30
End: 2025-09-05

## 2025-09-05 ENCOUNTER — APPOINTMENT (OUTPATIENT)
Dept: ELECTROPHYSIOLOGY | Facility: CLINIC | Age: 30
End: 2025-09-05

## 2025-09-05 PROCEDURE — 93298 REM INTERROG DEV EVAL SCRMS: CPT

## 2025-09-15 ENCOUNTER — APPOINTMENT (OUTPATIENT)
Dept: CARDIOLOGY | Facility: CLINIC | Age: 30
End: 2025-09-15